# Patient Record
Sex: FEMALE | Race: WHITE | NOT HISPANIC OR LATINO | URBAN - METROPOLITAN AREA
[De-identification: names, ages, dates, MRNs, and addresses within clinical notes are randomized per-mention and may not be internally consistent; named-entity substitution may affect disease eponyms.]

---

## 2019-03-14 ENCOUNTER — OUTPATIENT (OUTPATIENT)
Dept: OUTPATIENT SERVICES | Facility: HOSPITAL | Age: 74
LOS: 1 days | Discharge: HOME | End: 2019-03-14

## 2019-03-14 DIAGNOSIS — E78.5 HYPERLIPIDEMIA, UNSPECIFIED: ICD-10-CM

## 2019-03-14 DIAGNOSIS — E11.9 TYPE 2 DIABETES MELLITUS WITHOUT COMPLICATIONS: ICD-10-CM

## 2019-03-14 DIAGNOSIS — I10 ESSENTIAL (PRIMARY) HYPERTENSION: ICD-10-CM

## 2020-03-12 VITALS — SYSTOLIC BLOOD PRESSURE: 136 MMHG | WEIGHT: 189 LBS | DIASTOLIC BLOOD PRESSURE: 86 MMHG

## 2022-09-09 DIAGNOSIS — Z82.3 FAMILY HISTORY OF STROKE: ICD-10-CM

## 2022-09-09 DIAGNOSIS — R31.29 OTHER MICROSCOPIC HEMATURIA: ICD-10-CM

## 2022-09-09 DIAGNOSIS — Z83.3 FAMILY HISTORY OF DIABETES MELLITUS: ICD-10-CM

## 2022-09-09 DIAGNOSIS — Z78.9 OTHER SPECIFIED HEALTH STATUS: ICD-10-CM

## 2022-09-09 DIAGNOSIS — I10 ESSENTIAL (PRIMARY) HYPERTENSION: ICD-10-CM

## 2022-09-09 DIAGNOSIS — Z82.49 FAMILY HISTORY OF ISCHEMIC HEART DISEASE AND OTHER DISEASES OF THE CIRCULATORY SYSTEM: ICD-10-CM

## 2022-09-09 DIAGNOSIS — Z85.3 PERSONAL HISTORY OF MALIGNANT NEOPLASM OF BREAST: ICD-10-CM

## 2022-09-09 DIAGNOSIS — Z92.89 PERSONAL HISTORY OF OTHER MEDICAL TREATMENT: ICD-10-CM

## 2022-09-09 DIAGNOSIS — Z78.0 ASYMPTOMATIC MENOPAUSAL STATE: ICD-10-CM

## 2022-09-15 ENCOUNTER — APPOINTMENT (OUTPATIENT)
Dept: OBGYN | Facility: CLINIC | Age: 77
End: 2022-09-15

## 2022-10-19 ENCOUNTER — NON-APPOINTMENT (OUTPATIENT)
Age: 77
End: 2022-10-19

## 2023-02-08 ENCOUNTER — APPOINTMENT (OUTPATIENT)
Dept: OBGYN | Facility: CLINIC | Age: 78
End: 2023-02-08
Payer: MEDICARE

## 2023-02-08 ENCOUNTER — LABORATORY RESULT (OUTPATIENT)
Age: 78
End: 2023-02-08

## 2023-02-08 VITALS
BODY MASS INDEX: 32.27 KG/M2 | WEIGHT: 189 LBS | HEIGHT: 64 IN | DIASTOLIC BLOOD PRESSURE: 84 MMHG | SYSTOLIC BLOOD PRESSURE: 146 MMHG | HEART RATE: 76 BPM

## 2023-02-08 DIAGNOSIS — Z12.31 ENCOUNTER FOR SCREENING MAMMOGRAM FOR MALIGNANT NEOPLASM OF BREAST: ICD-10-CM

## 2023-02-08 DIAGNOSIS — Z01.419 ENCOUNTER FOR GYNECOLOGICAL EXAMINATION (GENERAL) (ROUTINE) W/OUT ABNORMAL FINDINGS: ICD-10-CM

## 2023-02-08 DIAGNOSIS — Z00.00 ENCOUNTER FOR GENERAL ADULT MEDICAL EXAMINATION W/OUT ABNORMAL FINDINGS: ICD-10-CM

## 2023-02-08 DIAGNOSIS — N39.0 URINARY TRACT INFECTION, SITE NOT SPECIFIED: ICD-10-CM

## 2023-02-08 DIAGNOSIS — Z78.9 OTHER SPECIFIED HEALTH STATUS: ICD-10-CM

## 2023-02-08 LAB
BILIRUB UR QL STRIP: NORMAL
CLARITY UR: CLEAR
COLLECTION METHOD: NORMAL
GLUCOSE UR-MCNC: NORMAL
HCG UR QL: 0.2 EU/DL
HGB UR QL STRIP.AUTO: NORMAL
KETONES UR-MCNC: NORMAL
LEUKOCYTE ESTERASE UR QL STRIP: NORMAL
NITRITE UR QL STRIP: POSITIVE
PH UR STRIP: 5
PROT UR STRIP-MCNC: NORMAL
SP GR UR STRIP: >=1.03

## 2023-02-08 PROCEDURE — G0101: CPT

## 2023-02-08 PROCEDURE — 99397 PER PM REEVAL EST PAT 65+ YR: CPT | Mod: GY

## 2023-02-08 PROCEDURE — 81003 URINALYSIS AUTO W/O SCOPE: CPT | Mod: QW

## 2023-02-08 RX ORDER — LEVOTHYROXINE SODIUM 0.05 MG/1
50 TABLET ORAL
Refills: 0 | Status: ACTIVE | COMMUNITY

## 2023-02-08 RX ORDER — NITROFURANTOIN (MONOHYDRATE/MACROCRYSTALS) 25; 75 MG/1; MG/1
100 CAPSULE ORAL
Qty: 14 | Refills: 2 | Status: ACTIVE | COMMUNITY
Start: 2023-02-08 | End: 1900-01-01

## 2023-02-08 RX ORDER — ROSUVASTATIN CALCIUM 5 MG/1
5 TABLET, FILM COATED ORAL
Refills: 0 | Status: ACTIVE | COMMUNITY

## 2023-02-08 RX ORDER — HYDROCHLOROTHIAZIDE 12.5 MG/1
12.5 TABLET ORAL
Refills: 0 | Status: ACTIVE | COMMUNITY

## 2023-02-08 RX ORDER — LOSARTAN POTASSIUM 100 MG/1
100 TABLET, FILM COATED ORAL
Refills: 0 | Status: ACTIVE | COMMUNITY

## 2023-02-08 RX ORDER — CHLORHEXIDINE GLUCONATE 4 %
5 LIQUID (ML) TOPICAL
Refills: 0 | Status: ACTIVE | COMMUNITY

## 2023-02-08 NOTE — PHYSICAL EXAM
[Examination Of The Breasts] : a normal appearance [No Masses] : no breast masses were palpable [Labia Majora] : normal [Labia Minora] : normal [Normal] : normal [Uterine Adnexae] : normal [FreeTextEntry2] : inner aspect of left labia majora a 2 cm round darkish pigmented lesion previously biopsied by me benign

## 2023-02-08 NOTE — PLAN
[FreeTextEntry1] : rto 1 year. Patient with asymptomatic  bacteria in urine. Urine sent out for cultures

## 2023-02-13 LAB — BACTERIA UR CULT: NORMAL

## 2023-02-27 DIAGNOSIS — R92.8 OTHER ABNORMAL AND INCONCLUSIVE FINDINGS ON DIAGNOSTIC IMAGING OF BREAST: ICD-10-CM

## 2024-05-20 ENCOUNTER — APPOINTMENT (OUTPATIENT)
Dept: ORTHOPEDIC SURGERY | Facility: CLINIC | Age: 79
End: 2024-05-20

## 2024-05-21 ENCOUNTER — APPOINTMENT (OUTPATIENT)
Dept: ORTHOPEDIC SURGERY | Facility: CLINIC | Age: 79
End: 2024-05-21
Payer: MEDICARE

## 2024-05-21 PROCEDURE — 99203 OFFICE O/P NEW LOW 30 MIN: CPT

## 2024-05-21 PROCEDURE — 73503 X-RAY EXAM HIP UNI 4/> VIEWS: CPT | Mod: RT

## 2024-05-21 NOTE — DISCUSSION/SUMMARY
[de-identified] : Treatment plan as discussed:  My clinical suspicion is high for flareup of arthritis of the hip given the patient's history, physical examination findings, and x-ray findings.  I recommended anti-inflammatory medication.  Meloxicam sent to patient's pharmacy to be taken as needed for pain. Benefits discussed. Confirmed no contraindication to NSAIDs.  The patient is currently being treated for breast cancer.  Therefore, I advised her to confirm with her oncologist that she is able to take NSAIDs as her current breast cancer medication may interfere with and contraindicated with these medications.  Strongly advised patient to confirm with oncologist before taking.  She expresses full understanding.  Red flag symptoms discussed.  I recommended patient rest, ice, compress, and elevate the hip regularly. Encouraged activity modification as tolerable. Encouraged gentle range of motion to avoid stiffness. No gym /sports at least until further evaluation.  Script for physical therapy provided for improved ROM and strengthening of surrounding musculature. Benefits discussed.  All questions and concerns addressed to patient's satisfaction. Patient expresses full understanding of treatment plan. Patient will follow up in 4-6 weeks with Ross CHÁVEZ. Will consider cortisone/gel injections in repeat evaluation if symptoms do not improve. Female

## 2024-05-21 NOTE — IMAGING
[de-identified] : Physical examination of the right hip: No swelling, ecchymosis, or erythema appreciated.  Skin is intact.  Patient mildly tense palpation along the lateral hip line and the groin area.  Full range of motion upon full flexion, extension, and internal/external rotation.  No weakness is appreciated.  Calf is soft and nontender.  Negative Homans' sign.  Mildly antalgic gait.  Negative straight leg raise.  Sensorimotor intact distally.  Neuro vas intact.  X-rays of the right hip taken in the office today:  No acute fractures, subluxations, or dislocations.  Mild to moderate osteoarthritic and degenerative changes appreciated throughout.

## 2024-05-21 NOTE — HISTORY OF PRESENT ILLNESS
[de-identified] : 78-year-old female for evaluation of right hip pain. Patient reports an aching nontraumatic pain that which worsens upon going from standing to standing position going up and down stairs.  Denies any trauma or falls.  He takes Advil over-the-counter without relief.  Able to bear weight and ambulate however experiences pain with doing so.  Patient reports she is a breast cancer patient currently is treated by an oncologist.

## 2024-06-25 ENCOUNTER — APPOINTMENT (OUTPATIENT)
Dept: ORTHOPEDIC SURGERY | Facility: CLINIC | Age: 79
End: 2024-06-25
Payer: MEDICARE

## 2024-06-25 DIAGNOSIS — M16.11 UNILATERAL PRIMARY OSTEOARTHRITIS, RIGHT HIP: ICD-10-CM

## 2024-06-25 PROCEDURE — 99213 OFFICE O/P EST LOW 20 MIN: CPT

## 2024-09-09 ENCOUNTER — INPATIENT (INPATIENT)
Facility: HOSPITAL | Age: 79
LOS: 2 days | Discharge: HOME | DRG: 310 | End: 2024-09-12
Attending: INTERNAL MEDICINE | Admitting: HOSPITALIST
Payer: MEDICARE

## 2024-09-09 VITALS
HEIGHT: 64 IN | DIASTOLIC BLOOD PRESSURE: 82 MMHG | WEIGHT: 186.95 LBS | OXYGEN SATURATION: 97 % | RESPIRATION RATE: 19 BRPM | HEART RATE: 143 BPM | SYSTOLIC BLOOD PRESSURE: 121 MMHG

## 2024-09-09 DIAGNOSIS — I48.92 UNSPECIFIED ATRIAL FLUTTER: ICD-10-CM

## 2024-09-09 LAB
ALBUMIN SERPL ELPH-MCNC: 4.6 G/DL — SIGNIFICANT CHANGE UP (ref 3.5–5.2)
ALP SERPL-CCNC: 54 U/L — SIGNIFICANT CHANGE UP (ref 30–115)
ALT FLD-CCNC: 18 U/L — SIGNIFICANT CHANGE UP (ref 0–41)
ANION GAP SERPL CALC-SCNC: 11 MMOL/L — SIGNIFICANT CHANGE UP (ref 7–14)
AST SERPL-CCNC: 17 U/L — SIGNIFICANT CHANGE UP (ref 0–41)
BASOPHILS # BLD AUTO: 0.04 K/UL — SIGNIFICANT CHANGE UP (ref 0–0.2)
BASOPHILS NFR BLD AUTO: 0.5 % — SIGNIFICANT CHANGE UP (ref 0–1)
BILIRUB SERPL-MCNC: 0.6 MG/DL — SIGNIFICANT CHANGE UP (ref 0.2–1.2)
BUN SERPL-MCNC: 19 MG/DL — SIGNIFICANT CHANGE UP (ref 10–20)
CALCIUM SERPL-MCNC: 9.4 MG/DL — SIGNIFICANT CHANGE UP (ref 8.4–10.5)
CHLORIDE SERPL-SCNC: 105 MMOL/L — SIGNIFICANT CHANGE UP (ref 98–110)
CO2 SERPL-SCNC: 23 MMOL/L — SIGNIFICANT CHANGE UP (ref 17–32)
CREAT SERPL-MCNC: 1 MG/DL — SIGNIFICANT CHANGE UP (ref 0.7–1.5)
EGFR: 57 ML/MIN/1.73M2 — LOW
EOSINOPHIL # BLD AUTO: 0.09 K/UL — SIGNIFICANT CHANGE UP (ref 0–0.7)
EOSINOPHIL NFR BLD AUTO: 1.2 % — SIGNIFICANT CHANGE UP (ref 0–8)
FLUAV AG NPH QL: SIGNIFICANT CHANGE UP
FLUBV AG NPH QL: SIGNIFICANT CHANGE UP
GAS PNL BLDV: SIGNIFICANT CHANGE UP
GLUCOSE SERPL-MCNC: 126 MG/DL — HIGH (ref 70–99)
HCT VFR BLD CALC: 39 % — SIGNIFICANT CHANGE UP (ref 37–47)
HGB BLD-MCNC: 13 G/DL — SIGNIFICANT CHANGE UP (ref 12–16)
IMM GRANULOCYTES NFR BLD AUTO: 0.4 % — HIGH (ref 0.1–0.3)
LYMPHOCYTES # BLD AUTO: 2.1 K/UL — SIGNIFICANT CHANGE UP (ref 1.2–3.4)
LYMPHOCYTES # BLD AUTO: 27.5 % — SIGNIFICANT CHANGE UP (ref 20.5–51.1)
MCHC RBC-ENTMCNC: 30.8 PG — SIGNIFICANT CHANGE UP (ref 27–31)
MCHC RBC-ENTMCNC: 33.3 G/DL — SIGNIFICANT CHANGE UP (ref 32–37)
MCV RBC AUTO: 92.4 FL — SIGNIFICANT CHANGE UP (ref 81–99)
MONOCYTES # BLD AUTO: 0.82 K/UL — HIGH (ref 0.1–0.6)
MONOCYTES NFR BLD AUTO: 10.7 % — HIGH (ref 1.7–9.3)
NEUTROPHILS # BLD AUTO: 4.56 K/UL — SIGNIFICANT CHANGE UP (ref 1.4–6.5)
NEUTROPHILS NFR BLD AUTO: 59.7 % — SIGNIFICANT CHANGE UP (ref 42.2–75.2)
NRBC # BLD: 0 /100 WBCS — SIGNIFICANT CHANGE UP (ref 0–0)
PLATELET # BLD AUTO: 173 K/UL — SIGNIFICANT CHANGE UP (ref 130–400)
PMV BLD: 11.2 FL — HIGH (ref 7.4–10.4)
POTASSIUM SERPL-MCNC: 4 MMOL/L — SIGNIFICANT CHANGE UP (ref 3.5–5)
POTASSIUM SERPL-SCNC: 4 MMOL/L — SIGNIFICANT CHANGE UP (ref 3.5–5)
PROT SERPL-MCNC: 6.8 G/DL — SIGNIFICANT CHANGE UP (ref 6–8)
RBC # BLD: 4.22 M/UL — SIGNIFICANT CHANGE UP (ref 4.2–5.4)
RBC # FLD: 14.4 % — SIGNIFICANT CHANGE UP (ref 11.5–14.5)
RSV RNA NPH QL NAA+NON-PROBE: SIGNIFICANT CHANGE UP
SARS-COV-2 RNA SPEC QL NAA+PROBE: SIGNIFICANT CHANGE UP
SODIUM SERPL-SCNC: 139 MMOL/L — SIGNIFICANT CHANGE UP (ref 135–146)
WBC # BLD: 7.64 K/UL — SIGNIFICANT CHANGE UP (ref 4.8–10.8)
WBC # FLD AUTO: 7.64 K/UL — SIGNIFICANT CHANGE UP (ref 4.8–10.8)

## 2024-09-09 PROCEDURE — 92960 CARDIOVERSION ELECTRIC EXT: CPT

## 2024-09-09 PROCEDURE — 84100 ASSAY OF PHOSPHORUS: CPT

## 2024-09-09 PROCEDURE — 84484 ASSAY OF TROPONIN QUANT: CPT

## 2024-09-09 PROCEDURE — 93010 ELECTROCARDIOGRAM REPORT: CPT | Mod: 77

## 2024-09-09 PROCEDURE — 80048 BASIC METABOLIC PNL TOTAL CA: CPT

## 2024-09-09 PROCEDURE — 83880 ASSAY OF NATRIURETIC PEPTIDE: CPT

## 2024-09-09 PROCEDURE — 83735 ASSAY OF MAGNESIUM: CPT

## 2024-09-09 PROCEDURE — 93325 DOPPLER ECHO COLOR FLOW MAPG: CPT

## 2024-09-09 PROCEDURE — 71045 X-RAY EXAM CHEST 1 VIEW: CPT | Mod: 26

## 2024-09-09 PROCEDURE — 86900 BLOOD TYPING SEROLOGIC ABO: CPT

## 2024-09-09 PROCEDURE — 85730 THROMBOPLASTIN TIME PARTIAL: CPT

## 2024-09-09 PROCEDURE — 99291 CRITICAL CARE FIRST HOUR: CPT | Mod: GC

## 2024-09-09 PROCEDURE — 80061 LIPID PANEL: CPT

## 2024-09-09 PROCEDURE — 93005 ELECTROCARDIOGRAM TRACING: CPT

## 2024-09-09 PROCEDURE — 85610 PROTHROMBIN TIME: CPT

## 2024-09-09 PROCEDURE — 86850 RBC ANTIBODY SCREEN: CPT

## 2024-09-09 PROCEDURE — 0241U: CPT

## 2024-09-09 PROCEDURE — 80053 COMPREHEN METABOLIC PANEL: CPT

## 2024-09-09 PROCEDURE — 83036 HEMOGLOBIN GLYCOSYLATED A1C: CPT

## 2024-09-09 PROCEDURE — 99222 1ST HOSP IP/OBS MODERATE 55: CPT | Mod: GC

## 2024-09-09 PROCEDURE — 85025 COMPLETE CBC W/AUTO DIFF WBC: CPT

## 2024-09-09 PROCEDURE — 93320 DOPPLER ECHO COMPLETE: CPT

## 2024-09-09 PROCEDURE — 93312 ECHO TRANSESOPHAGEAL: CPT

## 2024-09-09 PROCEDURE — 86901 BLOOD TYPING SEROLOGIC RH(D): CPT

## 2024-09-09 PROCEDURE — 87040 BLOOD CULTURE FOR BACTERIA: CPT

## 2024-09-09 PROCEDURE — 93010 ELECTROCARDIOGRAM REPORT: CPT

## 2024-09-09 PROCEDURE — 93308 TTE F-UP OR LMTD: CPT

## 2024-09-09 PROCEDURE — 84436 ASSAY OF TOTAL THYROXINE: CPT

## 2024-09-09 PROCEDURE — 84443 ASSAY THYROID STIM HORMONE: CPT

## 2024-09-09 PROCEDURE — 36415 COLL VENOUS BLD VENIPUNCTURE: CPT

## 2024-09-09 PROCEDURE — 85379 FIBRIN DEGRADATION QUANT: CPT

## 2024-09-09 PROCEDURE — 93307 TTE W/O DOPPLER COMPLETE: CPT

## 2024-09-09 RX ORDER — LOSARTAN POTASSIUM 50 MG/1
1 TABLET ORAL
Refills: 0 | DISCHARGE

## 2024-09-09 RX ORDER — LETROZOLE 2.5 MG/1
2.5 TABLET, FILM COATED ORAL DAILY
Refills: 0 | Status: DISCONTINUED | OUTPATIENT
Start: 2024-09-09 | End: 2024-09-12

## 2024-09-09 RX ORDER — APIXABAN 5 MG/1
5 TABLET, FILM COATED ORAL ONCE
Refills: 0 | Status: COMPLETED | OUTPATIENT
Start: 2024-09-09 | End: 2024-09-09

## 2024-09-09 RX ORDER — ROSUVASTATIN CALCIUM 10 MG/1
5 TABLET ORAL AT BEDTIME
Refills: 0 | Status: DISCONTINUED | OUTPATIENT
Start: 2024-09-09 | End: 2024-09-12

## 2024-09-09 RX ORDER — SODIUM CHLORIDE 9 MG/ML
1000 INJECTION INTRAMUSCULAR; INTRAVENOUS; SUBCUTANEOUS ONCE
Refills: 0 | Status: COMPLETED | OUTPATIENT
Start: 2024-09-09 | End: 2024-09-09

## 2024-09-09 RX ORDER — LEVOTHYROXINE SODIUM 100 MCG
1 TABLET ORAL
Refills: 0 | DISCHARGE

## 2024-09-09 RX ORDER — HYDROCHLOROTHIAZIDE 12.5 MG/1
1 CAPSULE ORAL
Refills: 0 | DISCHARGE

## 2024-09-09 RX ORDER — DILTIAZEM HYDROCHLORIDE 5 MG/ML
60 INJECTION INTRAVENOUS ONCE
Refills: 0 | Status: COMPLETED | OUTPATIENT
Start: 2024-09-09 | End: 2024-09-09

## 2024-09-09 RX ORDER — APIXABAN 5 MG/1
5 TABLET, FILM COATED ORAL EVERY 12 HOURS
Refills: 0 | Status: DISCONTINUED | OUTPATIENT
Start: 2024-09-10 | End: 2024-09-10

## 2024-09-09 RX ORDER — DILTIAZEM HYDROCHLORIDE 5 MG/ML
21 INJECTION INTRAVENOUS ONCE
Refills: 0 | Status: COMPLETED | OUTPATIENT
Start: 2024-09-09 | End: 2024-09-09

## 2024-09-09 RX ORDER — LETROZOLE 2.5 MG/1
1 TABLET, FILM COATED ORAL
Refills: 0 | DISCHARGE

## 2024-09-09 RX ORDER — ENOXAPARIN SODIUM 100 MG/ML
85 INJECTION SUBCUTANEOUS EVERY 12 HOURS
Refills: 0 | Status: DISCONTINUED | OUTPATIENT
Start: 2024-09-09 | End: 2024-09-09

## 2024-09-09 RX ORDER — LEVOTHYROXINE SODIUM 100 MCG
50 TABLET ORAL DAILY
Refills: 0 | Status: DISCONTINUED | OUTPATIENT
Start: 2024-09-09 | End: 2024-09-12

## 2024-09-09 RX ORDER — LOSARTAN POTASSIUM 50 MG/1
50 TABLET ORAL DAILY
Refills: 0 | Status: DISCONTINUED | OUTPATIENT
Start: 2024-09-09 | End: 2024-09-12

## 2024-09-09 RX ORDER — METOPROLOL TARTRATE 100 MG/1
12.5 TABLET ORAL EVERY 12 HOURS
Refills: 0 | Status: DISCONTINUED | OUTPATIENT
Start: 2024-09-09 | End: 2024-09-12

## 2024-09-09 RX ADMIN — APIXABAN 5 MILLIGRAM(S): 5 TABLET, FILM COATED ORAL at 17:07

## 2024-09-09 RX ADMIN — SODIUM CHLORIDE 1000 MILLILITER(S): 9 INJECTION INTRAMUSCULAR; INTRAVENOUS; SUBCUTANEOUS at 14:26

## 2024-09-09 RX ADMIN — DILTIAZEM HYDROCHLORIDE 21 MILLIGRAM(S): 5 INJECTION INTRAVENOUS at 14:47

## 2024-09-09 RX ADMIN — DILTIAZEM HYDROCHLORIDE 60 MILLIGRAM(S): 5 INJECTION INTRAVENOUS at 17:07

## 2024-09-09 NOTE — ED PROVIDER NOTE - ATTENDING CONTRIBUTION TO CARE
I personally evaluated patient. I agree with the findings and plan with all documentation on chart except as documented  in my note.    78-year-old female who presents to the emergency department referred from urgent care for tachycardia.  Patient reports feeling congested and having a cough over the past several days and was taking cough medicine.  She took the cough medicine that is safe with hypertension and did not have dextromethorphan.  Patient denies any fever or chills.  Symptoms were not improving and patient went to urgent care today and found to have a heart rate of 140s to 150s and ambulance was called.  Patient has no history of A-fib.    Patient seen and evaluated and is tachycardic in 140s.  EKG performed and concerning for possible a flutter.  Patient has normal mental status and is stable and has good blood pressure.  Neuroexam is nonfocal and patient has no signs of DVT on exam.  Patient has clear lungs bilaterally.  Large-bore IV access obtained and full labs sent, chest x-ray performed, bedside echo performed.  Patient given Cardizem and was rate controlled and repeat EKG consistent with a flutter.  CHADS2 Vasc score calculated and patient is appropriate for Eliquis.  Bedside ultrasound performed by ultrasound team and shows decreased EF.  Will admit patient to telemetry for new onset a flutter with decreased EF.  Patient spoken to in detail about results and plan of care, need for admission.  All questions and concerns addressed.  Patient given oral bridge with Cardizem and Eliquis dose.

## 2024-09-09 NOTE — H&P ADULT - ATTENDING COMMENTS
HPI:  Ms. Ramos is 79 yo F with a PMH of HTN, HLD, Hypothyroidism, and Breast Cancer presenting after being seen in urgent care where she was found to be in Aflutter to a rate of 140. She had been experiencing a cough over the past 3 days which is what prompted her visit to urgent care, but otherwise denies other symptoms. She denies chest pain, shortness of breath, abdominal pain, nausea, vomiting, and diarrhea. She says that many years ago (>>10 years ago) she was told that she had a "faulty electrical system in my heart" but was never on any medication, blood thinners, or underwent an echocardiogram or other cardiac procedures. She is admitted to medicine for management of new Afib.    Vitals  Temp: 98  HR: 143 -> 96 -> 106  BP: 121/82  O2: 95% on RA -> 96% on RA  RR: 18 ->18    Labs  WBC: 7.64  HgB: 13.0  Lactate: 1.6    Imaging  CXR: Prominent bilateral pulmonary vessels may represent congestion.    EKG: Aflutter    In the ED  1L NS Bolus x1  Eliquis 5mg PO x1  Diltiazem 21mg IV x1  Diltiazem 60mg PO x1   (09 Sep 2024 21:23)    REVIEW OF SYSTEMS: see cc/HPI   CONSTITUTIONAL: No weakness, fevers or chills  EYES/ENT: No visual changes;  No vertigo or throat pain   NECK: No pain or stiffness  RESPIRATORY: ((+) cough, wheezing, hemoptysis; No shortness of breath, see cc/HPI   CARDIOVASCULAR: No chest pain (+) palpitations  GASTROINTESTINAL: No abdominal or epigastric pain. No nausea, vomiting, or hematemesis; No diarrhea or constipation. No melena or hematochezia.  GENITOURINARY: No dysuria, frequency or hematuria  NEUROLOGICAL: No numbness or weakness  SKIN: No itching, rashes  ENDO: No hyperglycemia, No thyroid disorder, No dyslipidemia   HEM: No bleeding, No easy bruising, No anemia   PSYCHE: No psychosis, No mood disorder No hallucinations No delusion   MSK: No deformity, No fracture, No Joint swelling    Physical Exam:  General: WN/WD NAD  Neurology: A&Ox3, nonfocal, follows commands  Eyes: PERRLA/ EOMI  ENT/Neck: Neck supple, trachea midline, No JVD  Respiratory: CTA B/L, No wheezing, rales, rhonchi  CV: Normal rate regular rhythm, S1S2, no murmurs, rubs or gallops  Abdominal: Soft, NT, ND +BS,   Extremities: No edema, + peripheral pulses  Skin: No Rashes, Hematoma, Ecchymosis      A/p  New onset A. flutter w/ RVR    suspected HF?EF 2/2 A. flutter (CXR w/ bilateral vasc congestion) ???VOLUME STATUS ###########  -admit to tele   -serial trop and EKG  -TSH  -c/w Eliquis 5mg BID  -EP eval     H/o hypothyroidism   -check TSH   -c/w current L thyroxine supplementation for now    HTN   -c/w losartan     Dyslipidemia   -statin     H/o breast cancer   -Letrozole HPI:  Ms. Ramos is 79 yo F with a PMH of HTN, HLD, Hypothyroidism, and Breast Cancer presenting after being seen in urgent care where she was found to be in Aflutter to a rate of 140. She had been experiencing a cough over the past 3 days which is what prompted her visit to urgent care, but otherwise denies other symptoms. She denies chest pain, shortness of breath, abdominal pain, nausea, vomiting, and diarrhea. She says that many years ago (>>10 years ago) she was told that she had a "faulty electrical system in my heart" but was never on any medication, blood thinners, or underwent an echocardiogram or other cardiac procedures. She is admitted to medicine for management of new Afib.    Vitals  Temp: 98  HR: 143 -> 96 -> 106  BP: 121/82  O2: 95% on RA -> 96% on RA  RR: 18 ->18    Labs  WBC: 7.64  HgB: 13.0  Lactate: 1.6    Imaging  CXR: Prominent bilateral pulmonary vessels may represent congestion.    EKG: Aflutter    In the ED  1L NS Bolus x1  Eliquis 5mg PO x1  Diltiazem 21mg IV x1  Diltiazem 60mg PO x1   (09 Sep 2024 21:23)    REVIEW OF SYSTEMS: see cc/HPI   CONSTITUTIONAL: No weakness, fevers or chills  EYES/ENT: No visual changes;  No vertigo or throat pain   NECK: No pain or stiffness  RESPIRATORY: ((+) cough, wheezing, hemoptysis; No shortness of breath, see cc/HPI   CARDIOVASCULAR: No chest pain (+) palpitations  GASTROINTESTINAL: No abdominal or epigastric pain. No nausea, vomiting, or hematemesis; No diarrhea or constipation. No melena or hematochezia.  GENITOURINARY: No dysuria, frequency or hematuria  NEUROLOGICAL: No numbness or weakness  SKIN: No itching, rashes  ENDO: No hyperglycemia, No thyroid disorder, No dyslipidemia   HEM: No bleeding, No easy bruising, No anemia   PSYCHE: No psychosis, No mood disorder No hallucinations No delusion   MSK: No deformity, No fracture, No Joint swelling    Physical Exam:  General: WN/WD NAD  Neurology: A&Ox3, nonfocal, follows commands  Eyes: PERRLA/ EOMI  ENT/Neck: Neck supple, trachea midline, No JVD  Respiratory: B/L basilar rales and cough, No wheezing,  No rhonchi  CV: Normal rate regular rhythm, S1S2, no murmurs, rubs or gallops  Abdominal: Soft, NT, ND +BS,   Extremities: (+) Mild pedal edema, + peripheral pulses  Skin: No Rashes, Hematoma, Ecchymosis      A/p  New onset A. flutter w/ RVR    suspected HF?EF 2/2 A. flutter (CXR w/ bilateral vasc congestion) w/ mild fluid overload  -admit to tele   -serial trop and EKG  -rate control w/ Metoprolol   -TSH  -c/w Eliquis 5mg BID  -EP eval     H/o hypothyroidism   -check TSH   -c/w current L thyroxine supplementation for now    HTN   -c/w losartan     Dyslipidemia   -statin     H/o breast cancer   -Letrozole    PATIENT SEEN by ATTENDING 9/9/24

## 2024-09-09 NOTE — H&P ADULT - ASSESSMENT
Ms. Ramos is 79 yo F with a PMH of HTN, HLD, Hypothyroidism, and Breast Cancer presenting for new onset Aflutter to rate of 140.    #New onset Aflutter  - Patient had cough x3d, took OTC cough medication does not remember name  - Went to urgent care, found to be in Aflutter to 140s, sent to ED  - Given Cardizem IV and PO in ED, rate controlled afterward  - Patient states she was told she had "faulty electrical system in my heart" >>10 years ago  - No ECHO, Anticoagulation, or other Cardiac workup she remembers  - Patient states she is not coughing, has no chest pain  -   #Hypothyroidism  - C/w Levothyroxine 50mcg QD  - TSH in AM    #HTN  - C/w Losartan 50mg QD, please confirm dose in AM    #HLD  - C/w Rosuvastatin 5mg QD     Ms. Ramos is 77 yo F with a PMH of HTN, HLD, Hypothyroidism, and Breast Cancer presenting for new onset Aflutter to rate of 140.    #New onset Aflutter  - Patient had cough x3d, took OTC cough medication does not remember name  - Went to urgent care, found to be in Aflutter to 140s, sent to ED  - Given Cardizem IV and PO in ED, rate controlled afterward  - Patient states she was told she had "faulty electrical system in my heart" >>10 years ago  - No ECHO, Anticoagulation, or other Cardiac workup she remembers  - Patient states she is not coughing, has no chest pain  - Given Eliquis 5mg in ED, continue BID  - Metoprolol Tartrate 12.5mg BID  - TTE  - NPO @ Midnight  - F/u EP eval    #Hypothyroidism  - C/w Levothyroxine 50mcg QD  - TSH in AM    #HTN  - C/w Losartan 50mg QD, please confirm dose in AM    #HLD  - C/w Rosuvastatin 5mg QD    #Hx of Breast Cancer  - C/w Letrozole 2.5mg QD    #Misc  #Code Status: Full Code  #DVT ppx: Eliquis  #GI ppx: N/A  #Diet: DASH, NPO at Midnight  #Activity: AAT  #Dispo: Telemetry

## 2024-09-09 NOTE — ED PROVIDER NOTE - CLINICAL SUMMARY MEDICAL DECISION MAKING FREE TEXT BOX
79 y/o female presented from urgent care for tachycardia. EKG showing new onset atrial flutter with rate in 140's. Cardizem 20mg IV given that resulted in rate control. Patient has remained hemodynamically stable. Po cardizem and Eliquis given. Admit to [facility] for further cardiac/EP evaluation.

## 2024-09-09 NOTE — ED PROVIDER NOTE - OBJECTIVE STATEMENT
78-year-old patient with PMH hypertension, breast CA, hypothyroidism presents to ED for tachycardia.  Patient states that she has been having a cough for 3 days, so yesterday she took an unknown cough medication OTC and last night took Advil PM OTC.  This morning she went to an urgent care for her symptoms and was found to be tachycardic to 140s, was told to go to ED.  On presentation patient tachycardic to 140s, a flutter appreciated on ECG.  Not on anticoagulation.  Denies fever, chills, SOB, chest pain, dizziness, blurry vision, headache, N/V/D, back pain, dysuria, any other complaints.

## 2024-09-09 NOTE — H&P ADULT - HISTORY OF PRESENT ILLNESS
Vitals  Temp: 98  HR: 143 -> 96 -> 106  BP: 121/82  O2: 95% on RA -> 96% on RA  RR: 18 ->18    Labs  WBC: 7.64  HgB: 13.0  Lactate: 1.6    Imaging  CXR: Prominent bilateral pulmonary vessels may represent congestion.    EKG: Aflutter    In the ED  1L NS Bolus x1  Eliquis 5mg PO x1  Diltiazem 21mg IV x1  Diltiazem 60mg PO x1   Ms. Ramos is 77 yo F with a PMH of HTN, HLD, Hypothyroidism, and Breast Cancer presenting after being seen in urgent care where she was found to be in Aflutter to a rate of 140. She had been experiencing a cough over the past 3 days which is what prompted her visit to urgent care, but otherwise denies other symptoms. She denies chest pain, shortness of breath, abdominal pain, nausea, vomiting, and diarrhea. She says that many years ago (>>10 years ago) she was told that she had a "faulty electrical system in my heart" but was never on any medication, blood thinners, or underwent an echocardiogram or other cardiac procedures. She is admitted to medicine for management of new Afib.    Vitals  Temp: 98  HR: 143 -> 96 -> 106  BP: 121/82  O2: 95% on RA -> 96% on RA  RR: 18 ->18    Labs  WBC: 7.64  HgB: 13.0  Lactate: 1.6    Imaging  CXR: Prominent bilateral pulmonary vessels may represent congestion.    EKG: Aflutter    In the ED  1L NS Bolus x1  Eliquis 5mg PO x1  Diltiazem 21mg IV x1  Diltiazem 60mg PO x1

## 2024-09-09 NOTE — ED PROVIDER NOTE - DIFFERENTIAL DIAGNOSIS
The differential diagnosis for patients clinical presentation includes but is not limited to:  Arrhythmia, PE, infection, PNA Differential Diagnosis The differential diagnosis for patients clinical presentation includes but is not limited to:  Arrhythmia, PE, URI, PNA

## 2024-09-09 NOTE — H&P ADULT - HIV OFFER
Eyes with no visual disturbances.  Ears clean and dry and no hearing difficulties. Nose with pink mucosa and no drainage.  Mouth mucous membranes moist and pink.  No tenderness or swelling to throat or neck. Opt out Nose with pink mucosa and no drainage.  Mouth mucous membranes moist and pink.  No swelling to throat or neck.

## 2024-09-09 NOTE — H&P ADULT - NSHPPHYSICALEXAM_GEN_ALL_CORE
General: NAD, lying in bed comfortably  Head: NCAT  Neck: Supple, no JVD  Cardiac: Aflutter, rate controlled  Pulmonary: Mild crackle at base, no wheezing  Abdominal: Soft, nontender, and nondistended  Extremities: No pitting edema  Neurologic: AOx3, nonfocal  Skin: No rashes or lesions

## 2024-09-09 NOTE — ED PROVIDER NOTE - ADDITIONAL EKG
I called pt and no answer. I left a voice message to pt to have her call [facility] at: [phone] in regards to the breast pump.  
Left message to call back for: stats update  Information to relay to patient:  Below message. Please have patient call [phone]    
Who is calling:   [facility]   Reason for Call:  Calling because their unable to get a hold of patient to see if there has been separation. Please advise of how to get a hold of patient. Regarding breast pump and letter of necessities signed by Dr. Mix  Date of last appointment with primary care: 11/04/19  Okay to leave a detailed message: Yes      
Who is calling:  Candace from [employer] with  [employer]  Reason for Call:  Requesting clarification. Is patient still using breast pump? Still ? How is the clinic aware of this? Please advise.  Date of last appointment with primary care: 11/4/19  Okay to leave a detailed message: Yes      
Additional EKG Note...

## 2024-09-09 NOTE — ED PROVIDER NOTE - PHYSICAL EXAMINATION
VITAL SIGNS: I have reviewed nursing notes and confirm.  CONSTITUTIONAL: Well-developed; well-nourished; in no acute distress.  SKIN: Skin exam is warm and dry, no acute rash.  EYES: PERRL, EOM intact; conjunctiva and sclera clear.  NECK: Supple; non tender.  CARD: S1, S2 normal; no murmurs, gallops, or rubs. Tachycardic.  RESP: Normal respiratory effort, no tachypnea or distress. Lungs CTAB, no wheezes, rales or rhonchi.  ABD: soft, NT/ND.  EXT: Normal ROM. No clubbing, cyanosis or edema.  NEURO: Alert, oriented. Grossly unremarkable. No focal deficits.  PSYCH: Cooperative, appropriate.

## 2024-09-09 NOTE — ED ADULT TRIAGE NOTE - CHIEF COMPLAINT QUOTE
Pt. BIBEMS c/o rapid HR. Pt. states she was at  and was sent in for elevated HR. Pt. denies chest pain. Pt, states she has been taking cough medicine and has been noticing elevated HR since.

## 2024-09-09 NOTE — H&P ADULT - NSHPREVIEWOFSYSTEMS_GEN_ALL_CORE
Review of Systems  Constitutional: No weakness, fevers, or chills  Eyes/ENT: No visual changes. No vertigo or throat pain   Neck: No pain or stiffness  Respiratory: Endorses cough, improved  Cardiovascular: No chest pain or palpitations  Gastrointestinal: No abdominal or epigastric pain. No nausea, vomiting, or hematemesis. No diarrhea or constipation. No melena or hematochezia.  Genitourinary: No dysuria, frequency, or hematuria  Musculoskeletal: FROM all extremities, normal strength, no calf tenderness  Neurological: No numbness or weakness  Skin: No itching or rashes

## 2024-09-10 ENCOUNTER — RESULT REVIEW (OUTPATIENT)
Age: 79
End: 2024-09-10

## 2024-09-10 LAB
A1C WITH ESTIMATED AVERAGE GLUCOSE RESULT: 6.8 % — HIGH (ref 4–5.6)
ALBUMIN SERPL ELPH-MCNC: 4.5 G/DL — SIGNIFICANT CHANGE UP (ref 3.5–5.2)
ALP SERPL-CCNC: 54 U/L — SIGNIFICANT CHANGE UP (ref 30–115)
ALT FLD-CCNC: 18 U/L — SIGNIFICANT CHANGE UP (ref 0–41)
ANION GAP SERPL CALC-SCNC: 11 MMOL/L — SIGNIFICANT CHANGE UP (ref 7–14)
ANION GAP SERPL CALC-SCNC: 11 MMOL/L — SIGNIFICANT CHANGE UP (ref 7–14)
APTT BLD: 35.4 SEC — SIGNIFICANT CHANGE UP (ref 27–39.2)
AST SERPL-CCNC: 16 U/L — SIGNIFICANT CHANGE UP (ref 0–41)
BASOPHILS # BLD AUTO: 0.03 K/UL — SIGNIFICANT CHANGE UP (ref 0–0.2)
BASOPHILS # BLD AUTO: 0.04 K/UL — SIGNIFICANT CHANGE UP (ref 0–0.2)
BASOPHILS NFR BLD AUTO: 0.4 % — SIGNIFICANT CHANGE UP (ref 0–1)
BASOPHILS NFR BLD AUTO: 0.4 % — SIGNIFICANT CHANGE UP (ref 0–1)
BILIRUB SERPL-MCNC: 0.8 MG/DL — SIGNIFICANT CHANGE UP (ref 0.2–1.2)
BUN SERPL-MCNC: 14 MG/DL — SIGNIFICANT CHANGE UP (ref 10–20)
BUN SERPL-MCNC: 16 MG/DL — SIGNIFICANT CHANGE UP (ref 10–20)
CALCIUM SERPL-MCNC: 9.2 MG/DL — SIGNIFICANT CHANGE UP (ref 8.4–10.5)
CALCIUM SERPL-MCNC: 9.3 MG/DL — SIGNIFICANT CHANGE UP (ref 8.4–10.5)
CHLORIDE SERPL-SCNC: 103 MMOL/L — SIGNIFICANT CHANGE UP (ref 98–110)
CHLORIDE SERPL-SCNC: 105 MMOL/L — SIGNIFICANT CHANGE UP (ref 98–110)
CHOLEST SERPL-MCNC: 137 MG/DL — SIGNIFICANT CHANGE UP
CO2 SERPL-SCNC: 22 MMOL/L — SIGNIFICANT CHANGE UP (ref 17–32)
CO2 SERPL-SCNC: 24 MMOL/L — SIGNIFICANT CHANGE UP (ref 17–32)
CREAT SERPL-MCNC: 0.9 MG/DL — SIGNIFICANT CHANGE UP (ref 0.7–1.5)
CREAT SERPL-MCNC: 0.9 MG/DL — SIGNIFICANT CHANGE UP (ref 0.7–1.5)
D DIMER BLD IA.RAPID-MCNC: 179 NG/ML DDU — SIGNIFICANT CHANGE UP
D DIMER BLD IA.RAPID-MCNC: 270 NG/ML DDU — HIGH
EGFR: 65 ML/MIN/1.73M2 — SIGNIFICANT CHANGE UP
EGFR: 65 ML/MIN/1.73M2 — SIGNIFICANT CHANGE UP
EOSINOPHIL # BLD AUTO: 0.07 K/UL — SIGNIFICANT CHANGE UP (ref 0–0.7)
EOSINOPHIL # BLD AUTO: 0.11 K/UL — SIGNIFICANT CHANGE UP (ref 0–0.7)
EOSINOPHIL NFR BLD AUTO: 0.9 % — SIGNIFICANT CHANGE UP (ref 0–8)
EOSINOPHIL NFR BLD AUTO: 1.2 % — SIGNIFICANT CHANGE UP (ref 0–8)
ESTIMATED AVERAGE GLUCOSE: 148 MG/DL — HIGH (ref 68–114)
GLUCOSE SERPL-MCNC: 135 MG/DL — HIGH (ref 70–99)
GLUCOSE SERPL-MCNC: 182 MG/DL — HIGH (ref 70–99)
HCT VFR BLD CALC: 37.1 % — SIGNIFICANT CHANGE UP (ref 37–47)
HCT VFR BLD CALC: 39.6 % — SIGNIFICANT CHANGE UP (ref 37–47)
HDLC SERPL-MCNC: 59 MG/DL — SIGNIFICANT CHANGE UP
HGB BLD-MCNC: 12.4 G/DL — SIGNIFICANT CHANGE UP (ref 12–16)
HGB BLD-MCNC: 13.3 G/DL — SIGNIFICANT CHANGE UP (ref 12–16)
IMM GRANULOCYTES NFR BLD AUTO: 0.5 % — HIGH (ref 0.1–0.3)
IMM GRANULOCYTES NFR BLD AUTO: 0.7 % — HIGH (ref 0.1–0.3)
INR BLD: 1.56 RATIO — HIGH (ref 0.65–1.3)
LIPID PNL WITH DIRECT LDL SERPL: 62 MG/DL — SIGNIFICANT CHANGE UP
LYMPHOCYTES # BLD AUTO: 1.24 K/UL — SIGNIFICANT CHANGE UP (ref 1.2–3.4)
LYMPHOCYTES # BLD AUTO: 16.2 % — LOW (ref 20.5–51.1)
LYMPHOCYTES # BLD AUTO: 2.1 K/UL — SIGNIFICANT CHANGE UP (ref 1.2–3.4)
LYMPHOCYTES # BLD AUTO: 23.4 % — SIGNIFICANT CHANGE UP (ref 20.5–51.1)
MAGNESIUM SERPL-MCNC: 1.9 MG/DL — SIGNIFICANT CHANGE UP (ref 1.8–2.4)
MAGNESIUM SERPL-MCNC: 2.1 MG/DL — SIGNIFICANT CHANGE UP (ref 1.8–2.4)
MCHC RBC-ENTMCNC: 30.9 PG — SIGNIFICANT CHANGE UP (ref 27–31)
MCHC RBC-ENTMCNC: 31.3 PG — HIGH (ref 27–31)
MCHC RBC-ENTMCNC: 33.4 G/DL — SIGNIFICANT CHANGE UP (ref 32–37)
MCHC RBC-ENTMCNC: 33.6 G/DL — SIGNIFICANT CHANGE UP (ref 32–37)
MCV RBC AUTO: 92.1 FL — SIGNIFICANT CHANGE UP (ref 81–99)
MCV RBC AUTO: 93.7 FL — SIGNIFICANT CHANGE UP (ref 81–99)
MONOCYTES # BLD AUTO: 0.67 K/UL — HIGH (ref 0.1–0.6)
MONOCYTES # BLD AUTO: 0.69 K/UL — HIGH (ref 0.1–0.6)
MONOCYTES NFR BLD AUTO: 7.7 % — SIGNIFICANT CHANGE UP (ref 1.7–9.3)
MONOCYTES NFR BLD AUTO: 8.7 % — SIGNIFICANT CHANGE UP (ref 1.7–9.3)
NEUTROPHILS # BLD AUTO: 5.62 K/UL — SIGNIFICANT CHANGE UP (ref 1.4–6.5)
NEUTROPHILS # BLD AUTO: 5.96 K/UL — SIGNIFICANT CHANGE UP (ref 1.4–6.5)
NEUTROPHILS NFR BLD AUTO: 66.6 % — SIGNIFICANT CHANGE UP (ref 42.2–75.2)
NEUTROPHILS NFR BLD AUTO: 73.3 % — SIGNIFICANT CHANGE UP (ref 42.2–75.2)
NON HDL CHOLESTEROL: 78 MG/DL — SIGNIFICANT CHANGE UP
NRBC # BLD: 0 /100 WBCS — SIGNIFICANT CHANGE UP (ref 0–0)
NRBC # BLD: 0 /100 WBCS — SIGNIFICANT CHANGE UP (ref 0–0)
NT-PROBNP SERPL-SCNC: 1580 PG/ML — HIGH (ref 0–300)
NT-PROBNP SERPL-SCNC: 1657 PG/ML — HIGH (ref 0–300)
PHOSPHATE SERPL-MCNC: 2.9 MG/DL — SIGNIFICANT CHANGE UP (ref 2.1–4.9)
PLATELET # BLD AUTO: 162 K/UL — SIGNIFICANT CHANGE UP (ref 130–400)
PLATELET # BLD AUTO: 180 K/UL — SIGNIFICANT CHANGE UP (ref 130–400)
PMV BLD: 10.8 FL — HIGH (ref 7.4–10.4)
PMV BLD: 11.4 FL — HIGH (ref 7.4–10.4)
POTASSIUM SERPL-MCNC: 3.5 MMOL/L — SIGNIFICANT CHANGE UP (ref 3.5–5)
POTASSIUM SERPL-MCNC: 3.6 MMOL/L — SIGNIFICANT CHANGE UP (ref 3.5–5)
POTASSIUM SERPL-SCNC: 3.5 MMOL/L — SIGNIFICANT CHANGE UP (ref 3.5–5)
POTASSIUM SERPL-SCNC: 3.6 MMOL/L — SIGNIFICANT CHANGE UP (ref 3.5–5)
PROT SERPL-MCNC: 6.7 G/DL — SIGNIFICANT CHANGE UP (ref 6–8)
PROTHROM AB SERPL-ACNC: 17.9 SEC — HIGH (ref 9.95–12.87)
RBC # BLD: 3.96 M/UL — LOW (ref 4.2–5.4)
RBC # BLD: 4.3 M/UL — SIGNIFICANT CHANGE UP (ref 4.2–5.4)
RBC # FLD: 14.5 % — SIGNIFICANT CHANGE UP (ref 11.5–14.5)
RBC # FLD: 14.6 % — HIGH (ref 11.5–14.5)
SODIUM SERPL-SCNC: 136 MMOL/L — SIGNIFICANT CHANGE UP (ref 135–146)
SODIUM SERPL-SCNC: 140 MMOL/L — SIGNIFICANT CHANGE UP (ref 135–146)
T4 AB SER-ACNC: 7.8 UG/DL — SIGNIFICANT CHANGE UP (ref 4.6–12)
TRIGL SERPL-MCNC: 77 MG/DL — SIGNIFICANT CHANGE UP
TROPONIN T, HIGH SENSITIVITY RESULT: 18 NG/L — HIGH (ref 6–13)
TROPONIN T, HIGH SENSITIVITY RESULT: 19 NG/L — HIGH (ref 6–13)
TSH SERPL-MCNC: 1.7 UIU/ML — SIGNIFICANT CHANGE UP (ref 0.27–4.2)
WBC # BLD: 7.67 K/UL — SIGNIFICANT CHANGE UP (ref 4.8–10.8)
WBC # BLD: 8.96 K/UL — SIGNIFICANT CHANGE UP (ref 4.8–10.8)
WBC # FLD AUTO: 7.67 K/UL — SIGNIFICANT CHANGE UP (ref 4.8–10.8)
WBC # FLD AUTO: 8.96 K/UL — SIGNIFICANT CHANGE UP (ref 4.8–10.8)

## 2024-09-10 PROCEDURE — 99233 SBSQ HOSP IP/OBS HIGH 50: CPT

## 2024-09-10 PROCEDURE — 93306 TTE W/DOPPLER COMPLETE: CPT | Mod: 26

## 2024-09-10 PROCEDURE — 99223 1ST HOSP IP/OBS HIGH 75: CPT

## 2024-09-10 PROCEDURE — 93010 ELECTROCARDIOGRAM REPORT: CPT

## 2024-09-10 RX ORDER — ENOXAPARIN SODIUM 100 MG/ML
85 INJECTION SUBCUTANEOUS EVERY 12 HOURS
Refills: 0 | Status: DISCONTINUED | OUTPATIENT
Start: 2024-09-10 | End: 2024-09-12

## 2024-09-10 RX ORDER — ENOXAPARIN SODIUM 100 MG/ML
85 INJECTION SUBCUTANEOUS EVERY 12 HOURS
Refills: 0 | Status: DISCONTINUED | OUTPATIENT
Start: 2024-09-10 | End: 2024-09-10

## 2024-09-10 RX ORDER — METOPROLOL TARTRATE 100 MG/1
25 TABLET ORAL ONCE
Refills: 0 | Status: COMPLETED | OUTPATIENT
Start: 2024-09-10 | End: 2024-09-10

## 2024-09-10 RX ORDER — POLYETHYLENE GLYCOL 3350 17 G/17G
17 POWDER, FOR SOLUTION ORAL DAILY
Refills: 0 | Status: DISCONTINUED | OUTPATIENT
Start: 2024-09-10 | End: 2024-09-12

## 2024-09-10 RX ORDER — ENOXAPARIN SODIUM 100 MG/ML
80 INJECTION SUBCUTANEOUS EVERY 12 HOURS
Refills: 0 | Status: DISCONTINUED | OUTPATIENT
Start: 2024-09-10 | End: 2024-09-10

## 2024-09-10 RX ORDER — FUROSEMIDE 40 MG
20 TABLET ORAL ONCE
Refills: 0 | Status: COMPLETED | OUTPATIENT
Start: 2024-09-10 | End: 2024-09-10

## 2024-09-10 RX ORDER — SENNA 187 MG
2 TABLET ORAL AT BEDTIME
Refills: 0 | Status: DISCONTINUED | OUTPATIENT
Start: 2024-09-10 | End: 2024-09-12

## 2024-09-10 RX ADMIN — METOPROLOL TARTRATE 12.5 MILLIGRAM(S): 100 TABLET ORAL at 18:00

## 2024-09-10 RX ADMIN — ROSUVASTATIN CALCIUM 5 MILLIGRAM(S): 10 TABLET ORAL at 21:46

## 2024-09-10 RX ADMIN — METOPROLOL TARTRATE 25 MILLIGRAM(S): 100 TABLET ORAL at 04:11

## 2024-09-10 RX ADMIN — LOSARTAN POTASSIUM 50 MILLIGRAM(S): 50 TABLET ORAL at 06:14

## 2024-09-10 RX ADMIN — Medication 20 MILLIGRAM(S): at 04:11

## 2024-09-10 RX ADMIN — APIXABAN 5 MILLIGRAM(S): 5 TABLET, FILM COATED ORAL at 06:14

## 2024-09-10 RX ADMIN — Medication 50 MICROGRAM(S): at 06:14

## 2024-09-10 RX ADMIN — METOPROLOL TARTRATE 12.5 MILLIGRAM(S): 100 TABLET ORAL at 06:15

## 2024-09-10 RX ADMIN — LETROZOLE 2.5 MILLIGRAM(S): 2.5 TABLET, FILM COATED ORAL at 11:31

## 2024-09-10 RX ADMIN — ENOXAPARIN SODIUM 85 MILLIGRAM(S): 100 INJECTION SUBCUTANEOUS at 21:47

## 2024-09-10 RX ADMIN — Medication 1 MILLIGRAM(S): at 04:34

## 2024-09-10 NOTE — CONSULT NOTE ADULT - SUBJECTIVE AND OBJECTIVE BOX
Patient is a 78y old  Female who presents with a chief complaint of Aflutter (10 Sep 2024 11:25)    HPI:  Ms. Ramos is 79 yo F with a PMH of HTN, HLD, Hypothyroidism, and Breast Cancer presenting after being seen in urgent care where she was found to be in Aflutter to a rate of 140. She had been experiencing a cough over the past 3 days which is what prompted her visit to urgent care, but otherwise denies other symptoms. She denies chest pain, shortness of breath, abdominal pain, nausea, vomiting, and diarrhea. She says that many years ago (>>10 years ago) she was told that she had a "faulty electrical system in my heart" but was never on any medication, blood thinners, or underwent an echocardiogram or other cardiac procedures. She is admitted to medicine for management of new Afib.    Vitals  Temp: 98  HR: 143 -> 96 -> 106  BP: 121/82  O2: 95% on RA -> 96% on RA  RR: 18 ->18    Labs  WBC: 7.64  HgB: 13.0  Lactate: 1.6    Imaging  CXR: Prominent bilateral pulmonary vessels may represent congestion.    EKG: Aflutter    In the ED  1L NS Bolus x1  Eliquis 5mg PO x1  Diltiazem 21mg IV x1  Diltiazem 60mg PO x1   (09 Sep 2024 21:23)      EP consulted for new onset aflutter with RVR. Pt presented with 3 days of cough, denies chest pain, palpitations, SOB. Sees cardiologist regularly in [place]. Endorses stress test that was many years ago.     REVIEW OF SYSTEMS    [x] A ten-point review of systems was otherwise negative except as noted.  [ ] Due to altered mental status/intubation, subjective information were not able to be obtained from the patient. History was obtained, to the extent possible, from review of the chart and collateral sources of information.      PAST MEDICAL & SURGICAL HISTORY:      Home Medications:  hydroCHLOROthiazide 12.5 mg oral tablet: 1 tab(s) orally once a day (09 Sep 2024 22:18)  letrozole 2.5 mg oral tablet: 1 tab(s) orally once a day (09 Sep 2024 22:19)  losartan 50 mg oral tablet: 1 tab(s) orally once a day (09 Sep 2024 22:19)  rosuvastatin 5 mg oral tablet: 1 tab(s) orally (09 Sep 2024 22:18)  Synthroid 50 mcg (0.05 mg) oral tablet: 1 tab(s) orally once a day (09 Sep 2024 22:18)      Allergies:  No Known Allergies      FAMILY HISTORY:      SOCIAL HISTORY:  CIGARETTES: denies   ALCOHOL: denies     MEDICATIONS  (STANDING):  enoxaparin Injectable 85 milliGRAM(s) SubCutaneous every 12 hours  hydrochlorothiazide 12.5 milliGRAM(s) Oral daily  letrozole 2.5 milliGRAM(s) Oral daily  levothyroxine 50 MICROGram(s) Oral daily  losartan 50 milliGRAM(s) Oral daily  metoprolol tartrate 12.5 milliGRAM(s) Oral every 12 hours  polyethylene glycol 3350 17 Gram(s) Oral daily  rosuvastatin 5 milliGRAM(s) Oral at bedtime  senna 2 Tablet(s) Oral at bedtime    MEDICATIONS  (PRN):      Vital Signs Last 24 Hrs  T(C): 36.7 (10 Sep 2024 13:09), Max: 36.9 (10 Sep 2024 10:53)  T(F): 98 (10 Sep 2024 13:09), Max: 98.4 (10 Sep 2024 10:53)  HR: 71 (10 Sep 2024 13:09) (71 - 140)  BP: 124/82 (10 Sep 2024 13:09) (113/79 - 156/65)  BP(mean): 112 (10 Sep 2024 00:35) (112 - 112)  RR: 18 (10 Sep 2024 13:09) (18 - 18)  SpO2: 97% (10 Sep 2024 10:53) (95% - 97%)    Parameters below as of 10 Sep 2024 10:53  Patient On (Oxygen Delivery Method): room air        PHYSICAL EXAM:    GENERAL: Obese female,  In no apparent distress, well nourished, and hydrated.  HEART: irregular rate and rhythm; No murmurs, rubs, or gallops.  PULMONARY: Clear to auscultation and perfusion.  No rales, wheezing, or rhonchi bilaterally.  ABDOMEN: Rounded, Soft, Nontender, Nondistended; Bowel sounds present  EXTREMITIES:  2+ Peripheral Pulses, No clubbing, cyanosis, or edema  NEUROLOGICAL: AO x4, BONILLA, speech clear    INTERPRETATION OF TELEMETRY: Aflitter variable block 80s    ECG:  < from: 12 Lead ECG (09.09.24 @ 14:57) >    Ventricular Rate 80 BPM    Atrial Rate 293 BPM    QRS Duration 124 ms    Q-T Interval 406 ms    QTC Calculation(Bazett) 468 ms    P Axis 266 degrees    R Axis 74 degrees    T Axis 153 degrees    Diagnosis Line Atrial flutter with variable A-V block  Anteroseptal infarct , age undetermined  ST & T wave abnormality, consider lateral ischemia  Abnormal ECG    < end of copied text >      I&O's Detail      LABS:                        12.4   7.67  )-----------( 162      ( 10 Sep 2024 11:17 )             37.1     09-10    140  |  105  |  14  ----------------------------<  135<H>  3.5   |  24  |  0.9    Ca    9.2      10 Sep 2024 11:17  Phos  2.9     09-10  Mg     1.9     09-10    TPro  6.7  /  Alb  4.5  /  TBili  0.8  /  DBili  x   /  AST  16  /  ALT  18  /  AlkPhos  54  09-10        PT/INR - ( 10 Sep 2024 11:17 )   PT: 17.90 sec;   INR: 1.56 ratio         PTT - ( 10 Sep 2024 11:17 )  PTT:35.4 sec  BNP      I&O's Detail      RADIOLOGY:  < from: TTE Echo Complete w/ Contrast w/o Doppler (09.10.24 @ 08:04) >  Summary:   1. Left ventricular ejection fraction, by visual estimation, is 35 to   40%. Abnormal septal motion consistent with conduction delay.   2. Normal left ventricular size. Mild concentric left ventricular   hypertrophy.   3. Normal right ventricular size. Borderline reduced right ventricular   systolic function.   4. Small, mobile mass attached to the atrial aspect of the posterior   mitralannulus (7.5 mm). Differential diagnosis includes degenerative   calcification vs. small thrombus. Transesophageal echocardiography may be   of further utility if clinically indicated.   5. Moderately enlarged left atrium.   6. Moderate mitral annular calcification.   7. Mild mitral valve regurgitation.   8. Mild-moderate tricuspid regurgitation.   9. Mild aortic regurgitation.  10. Sclerotic aortic valve with normal opening.    PHYSICIAN INTERPRETATION:  Left Ventricle: The left ventricular internal cavity size is normal.   There is mild concentric left ventricular hypertrophy. Left ventricular   ejection fraction, by visual estimation, is 35 to 40%. Abnormal   (paradoxical) septal motion, consistent with left bundle branch block.   SpectralDoppler shows restrictive pattern of left ventricular myocardial   filling (Grade III diastolic dysfunction).  Right Ventricle: The right ventricular size is normal. RV systolic   function is low normal.  Left Atrium: Moderately enlarged left atrium.Small, mobile mass attached   to the atrial aspect of the posterior mitral annulus (7.5 mm).   Differential diagnosis includes degenerative calcification vs. thrombus.   Transesophageal echocardiography may be of further utility if clinically   indicated.  Right Atrium: Normal right atrial size.  Pericardium: There is no evidence of pericardial effusion.  Mitral Valve: There is moderate mitral annular calcification. No evidence   of mitral valve stenosis. Mild mitral valve regurgitation is seen.  Tricuspid Valve: Mild-moderate tricuspid regurgitation is visualized.   Estimated pulmonary artery systolic pressure is 37.0 mmHg assuming a   right atrial pressure of 10 mmHg, which is consistent with borderline   pulmonary hypertension.  Aortic Valve: The aortic valve is trileaflet. Sclerotic aortic valve with   normal opening. Mild aortic valve regurgitation is seen.  Pulmonic Valve: Mild pulmonic valve regurgitation.  Aorta: The aortic root and ascending aorta are structurally normal, with   no evidence of dilitation.  Venous: The inferior vena cava was normal sized, with respiratory size   variation less than 50%.  In comparison to the previous echocardiogram(s): There are no prior   studies on this patient for comparison purposes.    < end of copied text >     Patient is a 78y old  Female who presents with a chief complaint of Aflutter (10 Sep 2024 11:25)    HPI:  Ms. Ramos is 77 yo F with a PMH of HTN, HLD, Hypothyroidism, and Breast Cancer presenting after being seen in urgent care where she was found to be in Aflutter to a rate of 140. She had been experiencing a cough over the past 3 days which is what prompted her visit to urgent care, but otherwise denies other symptoms. She denies chest pain, shortness of breath, abdominal pain, nausea, vomiting, and diarrhea. She says that many years ago (>>10 years ago) she was told that she had a "faulty electrical system in my heart" but was never on any medication, blood thinners, or underwent an echocardiogram or other cardiac procedures. She is admitted to medicine for management of new Afib.    Vitals  Temp: 98  HR: 143 -> 96 -> 106  BP: 121/82  O2: 95% on RA -> 96% on RA  RR: 18 ->18    Labs  WBC: 7.64  HgB: 13.0  Lactate: 1.6    Imaging  CXR: Prominent bilateral pulmonary vessels may represent congestion.    EKG: Aflutter    In the ED  1L NS Bolus x1  Eliquis 5mg PO x1  Diltiazem 21mg IV x1  Diltiazem 60mg PO x1   (09 Sep 2024 21:23)      EP consulted for new onset aflutter with RVR. Pt presented with 3 days of cough, denies chest pain, palpitations, SOB. Sees cardiologist regularly in [place]. Endorses stress test that was many years ago.     REVIEW OF SYSTEMS    [x] A ten-point review of systems was otherwise negative except as noted.  [ ] Due to altered mental status/intubation, subjective information were not able to be obtained from the patient. History was obtained, to the extent possible, from review of the chart and collateral sources of information.      PAST MEDICAL & SURGICAL HISTORY:      Home Medications:  hydroCHLOROthiazide 12.5 mg oral tablet: 1 tab(s) orally once a day (09 Sep 2024 22:18)  letrozole 2.5 mg oral tablet: 1 tab(s) orally once a day (09 Sep 2024 22:19)  losartan 50 mg oral tablet: 1 tab(s) orally once a day (09 Sep 2024 22:19)  rosuvastatin 5 mg oral tablet: 1 tab(s) orally (09 Sep 2024 22:18)  Synthroid 50 mcg (0.05 mg) oral tablet: 1 tab(s) orally once a day (09 Sep 2024 22:18)      Allergies:  No Known Allergies      FAMILY HISTORY:      SOCIAL HISTORY:  CIGARETTES: denies   ALCOHOL: denies     MEDICATIONS  (STANDING):  enoxaparin Injectable 85 milliGRAM(s) SubCutaneous every 12 hours  hydrochlorothiazide 12.5 milliGRAM(s) Oral daily  letrozole 2.5 milliGRAM(s) Oral daily  levothyroxine 50 MICROGram(s) Oral daily  losartan 50 milliGRAM(s) Oral daily  metoprolol tartrate 12.5 milliGRAM(s) Oral every 12 hours  polyethylene glycol 3350 17 Gram(s) Oral daily  rosuvastatin 5 milliGRAM(s) Oral at bedtime  senna 2 Tablet(s) Oral at bedtime    MEDICATIONS  (PRN):      Vital Signs Last 24 Hrs  T(C): 36.7 (10 Sep 2024 13:09), Max: 36.9 (10 Sep 2024 10:53)  T(F): 98 (10 Sep 2024 13:09), Max: 98.4 (10 Sep 2024 10:53)  HR: 71 (10 Sep 2024 13:09) (71 - 140)  BP: 124/82 (10 Sep 2024 13:09) (113/79 - 156/65)  BP(mean): 112 (10 Sep 2024 00:35) (112 - 112)  RR: 18 (10 Sep 2024 13:09) (18 - 18)  SpO2: 97% (10 Sep 2024 10:53) (95% - 97%)    Parameters below as of 10 Sep 2024 10:53  Patient On (Oxygen Delivery Method): room air        PHYSICAL EXAM:    GENERAL: Obese female,  In no apparent distress, well nourished, and hydrated.  HEART: irregular rate and rhythm; No murmurs, rubs, or gallops.  PULMONARY: Clear to auscultation and perfusion.  No rales, wheezing, or rhonchi bilaterally.  ABDOMEN: Rounded, Soft, Nontender, Nondistended; Bowel sounds present  EXTREMITIES:  2+ Peripheral Pulses, No clubbing, cyanosis, or edema  NEUROLOGICAL: AO x4, BONILLA, speech clear    INTERPRETATION OF TELEMETRY: Aflitter variable block 80s    ECG:  < from: 12 Lead ECG (09.09.24 @ 14:57) >    Ventricular Rate 80 BPM    Atrial Rate 293 BPM    QRS Duration 124 ms    Q-T Interval 406 ms    QTC Calculation(Bazett) 468 ms    P Axis 266 degrees    R Axis 74 degrees    T Axis 153 degrees    Diagnosis Line Atrial flutter with variable A-V block  Anteroseptal infarct , age undetermined  ST & T wave abnormality, consider lateral ischemia  Abnormal ECG    < end of copied text >      I&O's Detail      LABS:                        12.4   7.67  )-----------( 162      ( 10 Sep 2024 11:17 )             37.1     09-10    140  |  105  |  14  ----------------------------<  135<H>  3.5   |  24  |  0.9    Ca    9.2      10 Sep 2024 11:17  Phos  2.9     09-10  Mg     1.9     09-10    TPro  6.7  /  Alb  4.5  /  TBili  0.8  /  DBili  x   /  AST  16  /  ALT  18  /  AlkPhos  54  09-10        PT/INR - ( 10 Sep 2024 11:17 )   PT: 17.90 sec;   INR: 1.56 ratio         PTT - ( 10 Sep 2024 11:17 )  PTT:35.4 sec  BNP      I&O's Detail      RADIOLOGY:  < from: TTE Echo Complete w/ Contrast w/o Doppler (09.10.24 @ 08:04) >  Summary:   1. Left ventricular ejection fraction, by visual estimation, is 35 to   40%. Abnormal septal motion consistent with conduction delay.   2. Normal left ventricular size. Mild concentric left ventricular   hypertrophy.   3. Normal right ventricular size. Borderline reduced right ventricular   systolic function.   4. Small, mobile mass attached to the atrial aspect of the posterior   mitral annulus (7.5 mm). Differential diagnosis includes degenerative   calcification vs. small thrombus. Transesophageal echocardiography may be   of further utility if clinically indicated.   5. Moderately enlarged left atrium.   6. Moderate mitral annular calcification.   7. Mild mitral valve regurgitation.   8. Mild-moderate tricuspid regurgitation.   9. Mild aortic regurgitation.  10. Sclerotic aortic valve with normal opening.    PHYSICIAN INTERPRETATION:  Left Ventricle: The left ventricular internal cavity size is normal.   There is mild concentric left ventricular hypertrophy. Left ventricular   ejection fraction, by visual estimation, is 35 to 40%. Abnormal   (paradoxical) septal motion, consistent with left bundle branch block.   SpectralDoppler shows restrictive pattern of left ventricular myocardial   filling (Grade III diastolic dysfunction).  Right Ventricle: The right ventricular size is normal. RV systolic   function is low normal.  Left Atrium: Moderately enlarged left atrium.Small, mobile mass attached   to the atrial aspect of the posterior mitral annulus (7.5 mm).   Differential diagnosis includes degenerative calcification vs. thrombus.   Transesophageal echocardiography may be of further utility if clinically   indicated.  Right Atrium: Normal right atrial size.  Pericardium: There is no evidence of pericardial effusion.  Mitral Valve: There is moderate mitral annular calcification. No evidence   of mitral valve stenosis. Mild mitral valve regurgitation is seen.  Tricuspid Valve: Mild-moderate tricuspid regurgitation is visualized.   Estimated pulmonary artery systolic pressure is 37.0 mmHg assuming a   right atrial pressure of 10 mmHg, which is consistent with borderline   pulmonary hypertension.  Aortic Valve: The aortic valve is trileaflet. Sclerotic aortic valve with   normal opening. Mild aortic valve regurgitation is seen.  Pulmonic Valve: Mild pulmonic valve regurgitation.  Aorta: The aortic root and ascending aorta are structurally normal, with   no evidence of dilitation.  Venous: The inferior vena cava was normal sized, with respiratory size   variation less than 50%.  In comparison to the previous echocardiogram(s): There are no prior   studies on this patient for comparison purposes.    < end of copied text >

## 2024-09-10 NOTE — CHART NOTE - NSCHARTNOTEFT_GEN_A_CORE
Consulted for MARCELO for a.flutter with RVR Consulted for MARCELO/[facility] eval for a.adriannaer with RVR    - Patient is A0X3 and agreeable to procedure   - Patient swallows efficiently but have a removable denture.   - Keep NPO PMN for procedure on 9/11/2024

## 2024-09-10 NOTE — PROGRESS NOTE ADULT - ASSESSMENT
Ms. Ramos is 77 yo F with a PMH of HTN, HLD, Hypothyroidism, and Breast Cancer presenting for new onset Aflutter to rate of 140.    #New onset Aflutter  - Patient had cough x3d, took OTC cough medication does not remember name  - Went to urgent care, found to be in Aflutter to 140s, sent to ED  - Given Cardizem IV and PO in ED, rate controlled afterward  - Patient states she was told she had "faulty electrical system in my heart" >>10 years ago  - No ECHO, Anticoagulation, or other Cardiac workup she remembers  - Patient states she is not coughing, has no chest pain  - Given Eliquis 5mg in ED, continue BID  - Metoprolol Tartrate 12.5mg BID  - Dimer 270. Pt started on Levonox 80 BID  - TTE 9/10/24: Left ventricular ejection fraction, by visual estimation, is 35 to 40%. Abnormal septal motion consistent with conduction delay.  - F/u EP eval    #Hypothyroidism  - C/w Levothyroxine 50mcg QD  - TSH in AM    #HTN  - C/w Losartan 50mg QD, please confirm dose in AM    #HLD  - C/w Rosuvastatin 5mg QD    #Hx of Breast Cancer  - C/w Letrozole 2.5mg QD    #Misc  #Code Status: Full Code  #DVT ppx: Eliquis  #GI ppx: N/A  #Diet: DASH  #Activity: AAT  #Dispo: Telemetry    Pending(s):  Ms. Ramos is 79 yo F with a PMH of HTN, HLD, Hypothyroidism, and Breast Cancer presenting for new onset Aflutter to rate of 140.    #New onset Aflutter  #Acute HFrEF 2/2 Aflutter   #small mobile mass?  - Patient had cough x3d, took OTC cough medication does not remember name  - Went to urgent care, found to be in Aflutter to 140s, sent to ED  - Given Cardizem IV and PO in ED, rate controlled afterward  - Patient states she was told she had "faulty electrical system in my heart" >>10 years ago  - No ECHO, Anticoagulation, or other Cardiac workup she remembers  - Patient states she is not coughing, has no chest pain  - Given Eliquis 5mg in ED, continue BID  - Metoprolol Tartrate 12.5mg BID  - Dimer 270. Pt started on Levonox 85 BID  - TTE 9/10/24: Left ventricular ejection fraction, by visual estimation, is 35 to 40%. Abnormal septal motion consistent with conduction delay.  - F/u EP eval  - Follow up cardiology   - trend trop   - check blood culture     #Hypothyroidism  - C/w Levothyroxine 50mcg QD  - TSH in AM    #HTN  - C/w Losartan 50mg QD, please confirm dose in AM    #HLD  - C/w Rosuvastatin 5mg QD    #Hx of Breast Cancer  - C/w Letrozole 2.5mg QD    #Misc  #Code Status: Full Code  #DVT ppx: Eliquis  #GI ppx: N/A  #Diet: DASH  #Activity: AAT  #Dispo: Telemetry    Pending(s):

## 2024-09-10 NOTE — CONSULT NOTE ADULT - ASSESSMENT
Cardiologist: ([place]) Dr. Sabiha Vicente [phone]    Ms. Ramos is 77 yo F with a PMH of HTN, HLD, Hypothyroidism, and Breast Cancer with cough for 3 days who is presenting after being seen in urgent care where she was found to be in Aflutter to a rate of 140. Treated in Ed with Cardizem IV/PO.    TTE: 35-40%, small mobile mass      TSH:    Impression: Cardiologist: ([place]) Dr. Sabiha Vicente [phone]    Ms. Ramos is 79 yo F with a PMH of HTN, HLD, Hypothyroidism, and Lt Breast Cancer (about 30 years ago s/p RT/sx, now again dx in 2023 s/p sx on medical mgmt) with cough for 3 days who is presenting after being seen in urgent care where she was found to be in Aflutter to a rate of 140. Treated in Ed with Cardizem IV/PO.    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 Sabiha Vicente [phone]    Ms. Ramos is 77 yo F with a PMH of HTN, HLD, Hypothyroidism, and Lt Breast Cancer (about 30 years ago s/p RT/sx, now again dx in 2023 s/p sx on medical mgmt) with cough for 3 days who is presenting after being seen in urgent care where she was found to be in Aflutter to a rate of 140. Treated in Ed with Cardizem IV/PO.    TTE: 35-40%, small mobile mass, mod LA enlargement, mild-mod MR          Impression:  New onset Aflutter with  bpm  LBBB  New cardiomyopathy, 35-40%  Hx HTN, HLD  Hx hypothryoidism  Hx Breast ca - medical mgmt    Plan:  - Rate control with beta blockers, increase as tolerated  - Avoid CCB given new low EF  - Obtain records from outpatient cardiologist - recent notes, prior ischemic shaffer, EKGs, TTE  - Cont Lovenox for stroke prevention - will need Eliquis on discharge 5 mg PO Q 12  - Cardio c/s for further evaluation   - Cont tele while admitted  - Check TSH  - Please recall EPS once records obtained

## 2024-09-10 NOTE — PROGRESS NOTE ADULT - SUBJECTIVE AND OBJECTIVE BOX
PAYAL PORTILLO  78y  Female    Reason for Admission:   OVERNIGHT/INTERIM: Pt seen and examined at bedside during AM rounds. No acute or major events.       Vital Signs Last 24 Hrs  T(C): 36.9 (10 Sep 2024 10:53), Max: 36.9 (10 Sep 2024 10:53)  T(F): 98.4 (10 Sep 2024 10:53), Max: 98.4 (10 Sep 2024 10:53)  HR: 86 (10 Sep 2024 10:53) (86 - 143)  BP: 140/81 (10 Sep 2024 10:53) (113/79 - 156/65)  BP(mean): 112 (10 Sep 2024 00:35) (112 - 112)  RR: 18 (10 Sep 2024 10:53) (18 - 19)  SpO2: 97% (10 Sep 2024 10:53) (95% - 97%)    Parameters below as of 10 Sep 2024 10:53  Patient On (Oxygen Delivery Method): room air        PHYSICAL EXAM:  GENERAL: NAD  HEENT - NC/AT, pupils equal and reactive to light,   NECK: Supple  CHEST/LUNG: Clear to auscultation bilaterally; No rales, rhonchi, wheezing  HEART: Regular rate and rhythm; No murmurs, rubs, or gallops  ABDOMEN: Soft, Nontender, Nondistended; Bowel sounds present  EXTREMITIES:   No clubbing, cyanosis, or edema      LABS:        MedsMEDICATIONS  (STANDING):  enoxaparin Injectable 80 milliGRAM(s) SubCutaneous every 12 hours  hydrochlorothiazide 12.5 milliGRAM(s) Oral daily  letrozole 2.5 milliGRAM(s) Oral daily  levothyroxine 50 MICROGram(s) Oral daily  losartan 50 milliGRAM(s) Oral daily  metoprolol tartrate 12.5 milliGRAM(s) Oral every 12 hours  polyethylene glycol 3350 17 Gram(s) Oral daily  rosuvastatin 5 milliGRAM(s) Oral at bedtime  senna 2 Tablet(s) Oral at bedtime    MEDICATIONS  (PRN):             PAYAL PORTILLO  78y  Female    Reason for Admission:   OVERNIGHT/INTERIM: Pt seen and examined at bedside during AM rounds. No acute or major events.       Vital Signs Last 24 Hrs  T(C): 36.9 (10 Sep 2024 10:53), Max: 36.9 (10 Sep 2024 10:53)  T(F): 98.4 (10 Sep 2024 10:53), Max: 98.4 (10 Sep 2024 10:53)  HR: 86 (10 Sep 2024 10:53) (86 - 143)  BP: 140/81 (10 Sep 2024 10:53) (113/79 - 156/65)  BP(mean): 112 (10 Sep 2024 00:35) (112 - 112)  RR: 18 (10 Sep 2024 10:53) (18 - 19)  SpO2: 97% (10 Sep 2024 10:53) (95% - 97%)    Parameters below as of 10 Sep 2024 10:53  Patient On (Oxygen Delivery Method): room air        PHYSICAL EXAM:  GENERAL: NAD  HEENT - NC/AT, pupils equal and reactive to light,   NECK: Supple  CHEST/LUNG: Clear to auscultation bilaterally; No rales, rhonchi, wheezing  HEART: irRegular rate and rhythm; No murmurs, rubs, or gallops  ABDOMEN: Soft, Nontender, Nondistended; Bowel sounds present  EXTREMITIES:   No clubbing, cyanosis, or edema      LABS:    MedsMEDICATIONS  (STANDING):  enoxaparin Injectable 80 milliGRAM(s) SubCutaneous every 12 hours  hydrochlorothiazide 12.5 milliGRAM(s) Oral daily  letrozole 2.5 milliGRAM(s) Oral daily  levothyroxine 50 MICROGram(s) Oral daily  losartan 50 milliGRAM(s) Oral daily  metoprolol tartrate 12.5 milliGRAM(s) Oral every 12 hours  polyethylene glycol 3350 17 Gram(s) Oral daily  rosuvastatin 5 milliGRAM(s) Oral at bedtime  senna 2 Tablet(s) Oral at bedtime    MEDICATIONS  (PRN):    < from: TTE Echo Complete w/ Contrast w/o Doppler (09.10.24 @ 08:04) >    Summary:   1. Left ventricular ejection fraction, by visual estimation, is 35 to   40%. Abnormal septal motion consistent with conduction delay.   2. Normal left ventricular size. Mild concentric left ventricular   hypertrophy.   3. Normal right ventricular size. Borderline reduced right ventricular   systolic function.   4. Small, mobile mass attached to the atrial aspect of the posterior   mitralannulus (7.5 mm). Differential diagnosis includes degenerative   calcification vs. small thrombus. Transesophageal echocardiography may be   of further utility if clinically indicated.   5. Moderately enlarged left atrium.   6. Moderate mitral annular calcification.   7. Mild mitral valve regurgitation.   8. Mild-moderate tricuspid regurgitation.   9. Mild aortic regurgitation.  10. Sclerotic aortic valve with normal opening.    < end of copied text >

## 2024-09-10 NOTE — CONSULT NOTE ADULT - ASSESSMENT
Incomplete New-onset Aflutter  New LV dysfunction / Cardiomyopathy possibly secondary to arrhythmia      - MARCELO/DCCV tomorrow 9/11  - NPO after midnight tonight  - Change Lovenox to Eliquis on discharge  - Continue Metoprolol and Losartan  - Outpatient follow-up with primary cardiologist for ischemic workup

## 2024-09-10 NOTE — CONSULT NOTE ADULT - NS ATTEND AMEND GEN_ALL_CORE FT
Oriented - self; Oriented - place; Oriented - time I have evaluated the patient with the fellow/resident/ACP, Marlene, and I am providing specific recommendations regarding the patient's management. My discussion with the team members included review of the patient's history, physical examination, laboratory data and procedural studies. I agree with the documented findings and plan as detailed above, with the exceptions noted.     78 year old female with history of breast Ca s/p RTX and resection (x2), now on hormonal therapy, presenting with shortness of breath. She was found to have a new-onset typical atrial flutter, LBBB (unknown duration) and depressed EF. On TTE: small, mobile mass attached to the atrial aspect of the posterior mitral annulus. No prior records in [facility].    Recommendations:  Tele  Continue rate-control with beta-blockers  Continue AC  CCTA to evaluate LA mass and coronary arteries  Obtain records from cardiologist in [place]  Will follow     Angeles Gibson MD  Cardiac Electrophysiology I have evaluated the patient with the fellow/resident/ACP, Marlene, and I am providing specific recommendations regarding the patient's management. My discussion with the team members included review of the patient's history, physical examination, laboratory data and procedural studies. I agree with the documented findings and plan as detailed above, with the exceptions noted.     78 year old female with history of breast Ca s/p RTX and resection (x2), now on hormonal therapy, presenting with shortness of breath. She was found to have a new-onset typical atrial flutter, LBBB (unknown duration) and depressed EF. On TTE: small, mobile mass attached to the atrial aspect of the posterior mitral annulus. No prior records in [facility].    Recommendations:  Tele  Continue rate-control with beta-blockers  On schedule for MARCELO/DCCV tomorrow  Continue AC  Obtain records from cardiologist in [place]  Will follow    Angeles Gibson MD  Cardiac Electrophysiology

## 2024-09-10 NOTE — CONSULT NOTE ADULT - SUBJECTIVE AND OBJECTIVE BOX
Cardiologist:    HPI:  78 F with HTN, HLD presents with new-onset Aflutter.        PAST MEDICAL & SURGICAL HISTORY:      SOCIAL HISTORY: Denies EtOH, smoking or drug use    Home Medications:  hydroCHLOROthiazide 12.5 mg oral tablet: 1 tab(s) orally once a day (09 Sep 2024 22:18)  letrozole 2.5 mg oral tablet: 1 tab(s) orally once a day (09 Sep 2024 22:19)  losartan 50 mg oral tablet: 1 tab(s) orally once a day (09 Sep 2024 22:19)  rosuvastatin 5 mg oral tablet: 1 tab(s) orally (09 Sep 2024 22:18)  Synthroid 50 mcg (0.05 mg) oral tablet: 1 tab(s) orally once a day (09 Sep 2024 22:18)      MEDICATIONS  (STANDING):  enoxaparin Injectable 85 milliGRAM(s) SubCutaneous every 12 hours  hydrochlorothiazide 12.5 milliGRAM(s) Oral daily  letrozole 2.5 milliGRAM(s) Oral daily  levothyroxine 50 MICROGram(s) Oral daily  losartan 50 milliGRAM(s) Oral daily  metoprolol tartrate 12.5 milliGRAM(s) Oral every 12 hours  polyethylene glycol 3350 17 Gram(s) Oral daily  rosuvastatin 5 milliGRAM(s) Oral at bedtime  senna 2 Tablet(s) Oral at bedtime      REVIEW OF SYSTEMS:  CONSTITUTIONAL: No fever, weight loss, fatigue  NECK: No pain or stiffness  RESPIRATORY: See HPI  CARDIOVASCULAR: See HPI  GASTROINTESTINAL: No abdominal/epigastric pain, nausea, vomiting, hematemesis, diarrhea, constipation, melena or hematochezia  GENITOURINARY: No dysuria, frequency, hematuria, incontinence  NEUROLOGICAL: No headaches, memory loss, loss of strength, numbness, tremors  SKIN: No itching, burning, rashes, lesions   ENDOCRINE: No heat/cold intolerance or hair loss  MUSCULOSKELETAL: No joint pain or swelling  HEME/LYMPH: No easy bruising or bleeding gums    Vital Signs Last 24 Hrs  T(C): 36.7 (10 Sep 2024 13:09), Max: 36.9 (10 Sep 2024 10:53)  T(F): 98 (10 Sep 2024 13:09), Max: 98.4 (10 Sep 2024 10:53)  HR: 71 (10 Sep 2024 13:09) (71 - 140)  BP: 124/82 (10 Sep 2024 13:09) (113/79 - 156/65)  BP(mean): 112 (10 Sep 2024 00:35) (112 - 112)  RR: 18 (10 Sep 2024 13:09) (18 - 18)  SpO2: 97% (10 Sep 2024 10:53) (95% - 97%)    Parameters below as of 10 Sep 2024 10:53  Patient On (Oxygen Delivery Method): room air        PHYSICAL EXAM:  General: NAD, AAOx3  HEENT: NCAT, EOMI  Neck: supple, no JVD  CV:   Respiratory: CTA bilaterally, no wheeze, rales or rhonchi	  Abdomen: soft, NT/ND, +BS  Extremities: warm, ROM nl, no clubbing, cyanosis or edema  Neuro: Nonfocal                            12.4   7.67  )-----------( 162      ( 10 Sep 2024 11:17 )             37.1         09-10    140  |  105  |  14  ----------------------------<  135<H>  3.5   |  24  |  0.9    Ca    9.2      10 Sep 2024 11:17  Phos  2.9     09-10  Mg     1.9     09-10    TPro  6.7  /  Alb  4.5  /  TBili  0.8  /  DBili  x   /  AST  16  /  ALT  18  /  AlkPhos  54  09-10   Cardiologist:    HPI:  78 F with HTN, HLD with cough, congestion, orthopnea for 3 days.  No angina.  Found to have new-onset Aflutter.      SOCIAL HISTORY: Denies EtOH, smoking or drug use    Home Medications:  hydroCHLOROthiazide 12.5 mg oral tablet: 1 tab(s) orally once a day (09 Sep 2024 22:18)  letrozole 2.5 mg oral tablet: 1 tab(s) orally once a day (09 Sep 2024 22:19)  losartan 50 mg oral tablet: 1 tab(s) orally once a day (09 Sep 2024 22:19)  rosuvastatin 5 mg oral tablet: 1 tab(s) orally (09 Sep 2024 22:18)  Synthroid 50 mcg (0.05 mg) oral tablet: 1 tab(s) orally once a day (09 Sep 2024 22:18)      MEDICATIONS  (STANDING):  enoxaparin Injectable 85 milliGRAM(s) SubCutaneous every 12 hours  hydrochlorothiazide 12.5 milliGRAM(s) Oral daily  letrozole 2.5 milliGRAM(s) Oral daily  levothyroxine 50 MICROGram(s) Oral daily  losartan 50 milliGRAM(s) Oral daily  metoprolol tartrate 12.5 milliGRAM(s) Oral every 12 hours  polyethylene glycol 3350 17 Gram(s) Oral daily  rosuvastatin 5 milliGRAM(s) Oral at bedtime  senna 2 Tablet(s) Oral at bedtime      REVIEW OF SYSTEMS:  CONSTITUTIONAL: No fever, weight loss, fatigue  NECK: No pain or stiffness  RESPIRATORY: See HPI  CARDIOVASCULAR: See HPI  GASTROINTESTINAL: No abdominal/epigastric pain, nausea, vomiting, hematemesis, diarrhea, constipation, melena or hematochezia  GENITOURINARY: No dysuria, frequency, hematuria, incontinence  NEUROLOGICAL: No headaches, memory loss, loss of strength, numbness, tremors  SKIN: No itching, burning, rashes, lesions   ENDOCRINE: No heat/cold intolerance or hair loss  MUSCULOSKELETAL: No joint pain or swelling  HEME/LYMPH: No easy bruising or bleeding gums    Vital Signs Last 24 Hrs  T(C): 36.7 (10 Sep 2024 13:09), Max: 36.9 (10 Sep 2024 10:53)  T(F): 98 (10 Sep 2024 13:09), Max: 98.4 (10 Sep 2024 10:53)  HR: 71 (10 Sep 2024 13:09) (71 - 140)  BP: 124/82 (10 Sep 2024 13:09) (113/79 - 156/65)  BP(mean): 112 (10 Sep 2024 00:35) (112 - 112)  RR: 18 (10 Sep 2024 13:09) (18 - 18)  SpO2: 97% (10 Sep 2024 10:53) (95% - 97%)    Parameters below as of 10 Sep 2024 10:53  Patient On (Oxygen Delivery Method): room air        PHYSICAL EXAM:  General: NAD, AAOx3  HEENT: NCAT, EOMI  Neck: supple, no JVD  CV:   Respiratory: CTA bilaterally, no wheeze, rales or rhonchi	  Abdomen: soft, NT/ND, +BS  Extremities: warm, ROM nl, no clubbing, cyanosis or edema  Neuro: Nonfocal                            12.4   7.67  )-----------( 162      ( 10 Sep 2024 11:17 )             37.1         09-10    140  |  105  |  14  ----------------------------<  135<H>  3.5   |  24  |  0.9    Ca    9.2      10 Sep 2024 11:17  Phos  2.9     09-10  Mg     1.9     09-10    TPro  6.7  /  Alb  4.5  /  TBili  0.8  /  DBili  x   /  AST  16  /  ALT  18  /  AlkPhos  54  09-10   Cardiologist:  Dr. Sabiha Vicente in [phone]    HPI:  78 F with HTN, HLD with cough, congestion, orthopnea for 3 days.  No angina.  Found to have new-onset Aflutter.      SOCIAL HISTORY: Denies EtOH, smoking or drug use    Home Medications:  hydroCHLOROthiazide 12.5 mg oral tablet: 1 tab(s) orally once a day (09 Sep 2024 22:18)  letrozole 2.5 mg oral tablet: 1 tab(s) orally once a day (09 Sep 2024 22:19)  losartan 50 mg oral tablet: 1 tab(s) orally once a day (09 Sep 2024 22:19)  rosuvastatin 5 mg oral tablet: 1 tab(s) orally (09 Sep 2024 22:18)  Synthroid 50 mcg (0.05 mg) oral tablet: 1 tab(s) orally once a day (09 Sep 2024 22:18)      MEDICATIONS  (STANDING):  enoxaparin Injectable 85 milliGRAM(s) SubCutaneous every 12 hours  hydrochlorothiazide 12.5 milliGRAM(s) Oral daily  letrozole 2.5 milliGRAM(s) Oral daily  levothyroxine 50 MICROGram(s) Oral daily  losartan 50 milliGRAM(s) Oral daily  metoprolol tartrate 12.5 milliGRAM(s) Oral every 12 hours  polyethylene glycol 3350 17 Gram(s) Oral daily  rosuvastatin 5 milliGRAM(s) Oral at bedtime  senna 2 Tablet(s) Oral at bedtime      REVIEW OF SYSTEMS:  CONSTITUTIONAL: No fever, weight loss, fatigue  NECK: No pain or stiffness  RESPIRATORY: See HPI  CARDIOVASCULAR: See HPI  GASTROINTESTINAL: No abdominal/epigastric pain, nausea, vomiting, hematemesis, diarrhea, constipation, melena or hematochezia  GENITOURINARY: No dysuria, frequency, hematuria, incontinence  NEUROLOGICAL: No headaches, memory loss, loss of strength, numbness, tremors  SKIN: No itching, burning, rashes, lesions   ENDOCRINE: No heat/cold intolerance or hair loss  MUSCULOSKELETAL: No joint pain or swelling  HEME/LYMPH: No easy bruising or bleeding gums    Vital Signs Last 24 Hrs  T(C): 36.7 (10 Sep 2024 13:09), Max: 36.9 (10 Sep 2024 10:53)  T(F): 98 (10 Sep 2024 13:09), Max: 98.4 (10 Sep 2024 10:53)  HR: 71 (10 Sep 2024 13:09) (71 - 140)  BP: 124/82 (10 Sep 2024 13:09) (113/79 - 156/65)  BP(mean): 112 (10 Sep 2024 00:35) (112 - 112)  RR: 18 (10 Sep 2024 13:09) (18 - 18)  SpO2: 97% (10 Sep 2024 10:53) (95% - 97%)    Parameters below as of 10 Sep 2024 10:53  Patient On (Oxygen Delivery Method): room air        PHYSICAL EXAM:  General: NAD, AAOx3  HEENT: NCAT, EOMI  Neck: supple, no JVD  CV: irregular, no mumurs, no LE edema  Respiratory: CTA bilaterally, no wheeze, rales or rhonchi	  Abdomen: soft, NT/ND, +BS  Extremities: warm, ROM nl, no clubbing, cyanosis or edema  Neuro: Nonfocal                            12.4   7.67  )-----------( 162      ( 10 Sep 2024 11:17 )             37.1         09-10    140  |  105  |  14  ----------------------------<  135<H>  3.5   |  24  |  0.9    Ca    9.2      10 Sep 2024 11:17  Phos  2.9     09-10  Mg     1.9     09-10    TPro  6.7  /  Alb  4.5  /  TBili  0.8  /  DBili  x   /  AST  16  /  ALT  18  /  AlkPhos  54  09-10        < from: TTE Echo Complete w/ Contrast w/o Doppler (09.10.24 @ 08:04) >  Summary:   1. Left ventricular ejection fraction, by visual estimation, is 35 to   40%. Abnormal septal motion consistent with conduction delay.   2. Normal left ventricular size. Mild concentric left ventricular   hypertrophy.   3. Normal right ventricular size. Borderline reduced right ventricular   systolic function.   4. Small, mobile mass attached to the atrial aspect of the posterior   mitralannulus (7.5 mm). Differential diagnosis includes degenerative   calcification vs. small thrombus. Transesophageal echocardiography may be   of further utility if clinically indicated.   5. Moderately enlarged left atrium.   6. Moderate mitral annular calcification.   7. Mild mitral valve regurgitation.   8. Mild-moderate tricuspid regurgitation.   9. Mild aortic regurgitation.  10. Sclerotic aortic valve with normal opening.    < end of copied text >

## 2024-09-10 NOTE — PATIENT PROFILE ADULT - PUBLIC BENEFITS
Spoke to patient, orders given for fasting,provided central scheduling contact number and lab hours.  Patient verbalized understanding no

## 2024-09-10 NOTE — PATIENT PROFILE ADULT - FALL HARM RISK - HARM RISK INTERVENTIONS
Assistance with ambulation/Assistance OOB with selected safe patient handling equipment/Communicate Risk of Fall with Harm to all staff/Reinforce activity limits and safety measures with patient and family/Review medications for side effects contributing to fall risk/Sit up slowly, dangle for a short time, stand at bedside before walking/Tailored Fall Risk Interventions/Toileting schedule using arm’s reach rule for commode and bathroom/Visual Cue: Yellow wristband and red socks/Bed in lowest position, wheels locked, appropriate side rails in place/Call bell, personal items and telephone in reach/Instruct patient to call for assistance before getting out of bed or chair/Non-slip footwear when patient is out of bed/[place] to call system/Physically safe environment - no spills, clutter or unnecessary equipment/Purposeful Proactive Rounding/Room/bathroom lighting operational, light cord in reach

## 2024-09-11 ENCOUNTER — RESULT REVIEW (OUTPATIENT)
Age: 79
End: 2024-09-11

## 2024-09-11 ENCOUNTER — TRANSCRIPTION ENCOUNTER (OUTPATIENT)
Age: 79
End: 2024-09-11

## 2024-09-11 LAB
ALBUMIN SERPL ELPH-MCNC: 4.1 G/DL — SIGNIFICANT CHANGE UP (ref 3.5–5.2)
ALP SERPL-CCNC: 49 U/L — SIGNIFICANT CHANGE UP (ref 30–115)
ALT FLD-CCNC: 18 U/L — SIGNIFICANT CHANGE UP (ref 0–41)
ANION GAP SERPL CALC-SCNC: 11 MMOL/L — SIGNIFICANT CHANGE UP (ref 7–14)
AST SERPL-CCNC: 16 U/L — SIGNIFICANT CHANGE UP (ref 0–41)
BASOPHILS # BLD AUTO: 0.03 K/UL — SIGNIFICANT CHANGE UP (ref 0–0.2)
BASOPHILS NFR BLD AUTO: 0.4 % — SIGNIFICANT CHANGE UP (ref 0–1)
BILIRUB SERPL-MCNC: 0.6 MG/DL — SIGNIFICANT CHANGE UP (ref 0.2–1.2)
BUN SERPL-MCNC: 14 MG/DL — SIGNIFICANT CHANGE UP (ref 10–20)
CALCIUM SERPL-MCNC: 9.2 MG/DL — SIGNIFICANT CHANGE UP (ref 8.4–10.5)
CHLORIDE SERPL-SCNC: 106 MMOL/L — SIGNIFICANT CHANGE UP (ref 98–110)
CO2 SERPL-SCNC: 25 MMOL/L — SIGNIFICANT CHANGE UP (ref 17–32)
CREAT SERPL-MCNC: 0.9 MG/DL — SIGNIFICANT CHANGE UP (ref 0.7–1.5)
EGFR: 65 ML/MIN/1.73M2 — SIGNIFICANT CHANGE UP
EOSINOPHIL # BLD AUTO: 0.1 K/UL — SIGNIFICANT CHANGE UP (ref 0–0.7)
EOSINOPHIL NFR BLD AUTO: 1.4 % — SIGNIFICANT CHANGE UP (ref 0–8)
GLUCOSE SERPL-MCNC: 135 MG/DL — HIGH (ref 70–99)
HCT VFR BLD CALC: 36.5 % — LOW (ref 37–47)
HGB BLD-MCNC: 12.3 G/DL — SIGNIFICANT CHANGE UP (ref 12–16)
IMM GRANULOCYTES NFR BLD AUTO: 0.3 % — SIGNIFICANT CHANGE UP (ref 0.1–0.3)
LYMPHOCYTES # BLD AUTO: 2.12 K/UL — SIGNIFICANT CHANGE UP (ref 1.2–3.4)
LYMPHOCYTES # BLD AUTO: 30.5 % — SIGNIFICANT CHANGE UP (ref 20.5–51.1)
MAGNESIUM SERPL-MCNC: 2 MG/DL — SIGNIFICANT CHANGE UP (ref 1.8–2.4)
MCHC RBC-ENTMCNC: 30.8 PG — SIGNIFICANT CHANGE UP (ref 27–31)
MCHC RBC-ENTMCNC: 33.7 G/DL — SIGNIFICANT CHANGE UP (ref 32–37)
MCV RBC AUTO: 91.3 FL — SIGNIFICANT CHANGE UP (ref 81–99)
MONOCYTES # BLD AUTO: 0.75 K/UL — HIGH (ref 0.1–0.6)
MONOCYTES NFR BLD AUTO: 10.8 % — HIGH (ref 1.7–9.3)
NEUTROPHILS # BLD AUTO: 3.92 K/UL — SIGNIFICANT CHANGE UP (ref 1.4–6.5)
NEUTROPHILS NFR BLD AUTO: 56.6 % — SIGNIFICANT CHANGE UP (ref 42.2–75.2)
NRBC # BLD: 0 /100 WBCS — SIGNIFICANT CHANGE UP (ref 0–0)
PHOSPHATE SERPL-MCNC: 2.5 MG/DL — SIGNIFICANT CHANGE UP (ref 2.1–4.9)
PLATELET # BLD AUTO: 157 K/UL — SIGNIFICANT CHANGE UP (ref 130–400)
PMV BLD: 11.2 FL — HIGH (ref 7.4–10.4)
POTASSIUM SERPL-MCNC: 3.2 MMOL/L — LOW (ref 3.5–5)
POTASSIUM SERPL-SCNC: 3.2 MMOL/L — LOW (ref 3.5–5)
PROT SERPL-MCNC: 6 G/DL — SIGNIFICANT CHANGE UP (ref 6–8)
RBC # BLD: 4 M/UL — LOW (ref 4.2–5.4)
RBC # FLD: 14.5 % — SIGNIFICANT CHANGE UP (ref 11.5–14.5)
SODIUM SERPL-SCNC: 142 MMOL/L — SIGNIFICANT CHANGE UP (ref 135–146)
TROPONIN T, HIGH SENSITIVITY RESULT: 18 NG/L — HIGH (ref 6–13)
WBC # BLD: 6.94 K/UL — SIGNIFICANT CHANGE UP (ref 4.8–10.8)
WBC # FLD AUTO: 6.94 K/UL — SIGNIFICANT CHANGE UP (ref 4.8–10.8)

## 2024-09-11 PROCEDURE — 93312 ECHO TRANSESOPHAGEAL: CPT | Mod: 26,XU

## 2024-09-11 PROCEDURE — 92960 CARDIOVERSION ELECTRIC EXT: CPT

## 2024-09-11 PROCEDURE — 99232 SBSQ HOSP IP/OBS MODERATE 35: CPT

## 2024-09-11 PROCEDURE — 93320 DOPPLER ECHO COMPLETE: CPT | Mod: 26

## 2024-09-11 PROCEDURE — 93325 DOPPLER ECHO COLOR FLOW MAPG: CPT | Mod: 26

## 2024-09-11 PROCEDURE — 93308 TTE F-UP OR LMTD: CPT | Mod: 26

## 2024-09-11 RX ADMIN — METOPROLOL TARTRATE 12.5 MILLIGRAM(S): 100 TABLET ORAL at 06:06

## 2024-09-11 RX ADMIN — ROSUVASTATIN CALCIUM 5 MILLIGRAM(S): 10 TABLET ORAL at 21:34

## 2024-09-11 RX ADMIN — ENOXAPARIN SODIUM 85 MILLIGRAM(S): 100 INJECTION SUBCUTANEOUS at 21:35

## 2024-09-11 RX ADMIN — LOSARTAN POTASSIUM 50 MILLIGRAM(S): 50 TABLET ORAL at 06:07

## 2024-09-11 RX ADMIN — ENOXAPARIN SODIUM 85 MILLIGRAM(S): 100 INJECTION SUBCUTANEOUS at 08:45

## 2024-09-11 RX ADMIN — LETROZOLE 2.5 MILLIGRAM(S): 2.5 TABLET, FILM COATED ORAL at 12:49

## 2024-09-11 RX ADMIN — Medication 50 MICROGRAM(S): at 06:06

## 2024-09-11 RX ADMIN — METOPROLOL TARTRATE 12.5 MILLIGRAM(S): 100 TABLET ORAL at 17:32

## 2024-09-11 NOTE — PROGRESS NOTE ADULT - ASSESSMENT
Ms. Ramos is 79 yo F with a PMH of HTN, HLD, Hypothyroidism, and Breast Cancer presenting for new onset Aflutter to rate of 140.      Plan    #New onset Aflutter  - Patient had cough x3d, took OTC cough medication does not remember name  - Went to urgent care, found to be in Aflutter to 140s, sent to ED  - Given Cardizem IV and PO in ED, rate controlled afterward  - Patient states she was told she had "faulty electrical system in my heart" >>10 years ago  - No ECHO, Anticoagulation, or other Cardiac workup she remembers  - Patient states she is not coughing, has no chest pain  - c/w therapeutic lovenox. transition ot eliquis on dc  - Metoprolol Tartrate 12.5mg BID  - MARCELO/DCCV today 9/11    #Hypothyroidism  - C/w Levothyroxine 50mcg QD  - TSH 1.70    #HTN  - C/w Losartan 50mg QD    #HLD  - C/w Rosuvastatin 5mg QD    #Hx of Breast Cancer  - C/w Letrozole 2.5mg QD    #Misc  #Code Status: Full Code  #DVT ppx: Eliquis  #GI ppx: N/A  #Diet: DASH, NPO at Midnight  #Activity: AAT  #Dispo: Telemetry

## 2024-09-11 NOTE — DISCHARGE NOTE PROVIDER - NSDCCPCAREPLAN_GEN_ALL_CORE_FT
PRINCIPAL DISCHARGE DIAGNOSIS  Diagnosis: Atrial flutter  Assessment and Plan of Treatment: You were admitted because of afib with fast heart rate. You underwent cardioversion procedure and was converted to normal heart rhythm.   After discharge, please take medications as prescribed. Please follow up with primary care doctor and cardiologist.      SECONDARY DISCHARGE DIAGNOSES  Diagnosis: Dysrhythmia  Assessment and Plan of Treatment:

## 2024-09-11 NOTE — PROGRESS NOTE ADULT - ATTENDING COMMENTS
I saw and examined the patient after she came back from MARCELO/DCCV  She feels ok, no chest pain or palpitation    A/P ;  Paroxysmal Atrial Flutter with Rapid Ventricular Response:   new HFrEF: possibly tachycardia induced  She went for MARCELO/DCCV s/p cardioversion, back to sinus rhythm  Start on eliquis  Continue Metoprolol. Start on Entresto.   Possible discharge on 24 hrs
HPI as above.  Interval history: Pt seen and examined at bedside. No cp or sob.   Vital Signs (24 Hrs):  T(C): 36.9 (09-10-24 @ 10:53), Max: 36.9 (09-10-24 @ 10:53)  HR: 86 (09-10-24 @ 10:53) (86 - 143)  BP: 140/81 (09-10-24 @ 10:53) (113/79 - 156/65)  RR: 18 (09-10-24 @ 10:53) (18 - 19)  SpO2: 97% (09-10-24 @ 10:53) (95% - 97%)  Wt(kg): --  Daily Height in cm: 162.56 (09 Sep 2024 13:59)    Daily     I&O's Summary    PHYSICAL EXAM:  GENERAL: NAD, well-developed  HEAD:  Atraumatic, Normocephalic  EYES: EOMI, PERRLA, conjunctiva and sclera clear  NECK: Supple, No JVD  CHEST/LUNG: Clear to auscultation bilaterally; No wheeze  HEART: Regular rate and rhythm; No murmurs, rubs, or gallops  ABDOMEN: Soft, Nontender, Nondistended; Bowel sounds present  EXTREMITIES:  2+ Peripheral Pulses, No clubbing, cyanosis, or edema  PSYCH: AAOx3  NEUROLOGY: non-focal  SKIN: No rashes or lesions  Labs reviewed  Imaging reviewed independently and reviewed read  EKG reviewed independently and reviewed read    Plan as above. DW resident. Agreed to resident assessment and plan. Made edits.       #Progress Note Handoff  Pending (specify):  follow up cardiology   Family discussion: house staff updated pt family  Disposition: cardiac telemonitoring    Decision to admit the pt is based on acuity as above

## 2024-09-11 NOTE — DISCHARGE NOTE PROVIDER - HOSPITAL COURSE
Ms. Ramos is 77 yo F with a PMH of HTN, HLD, Hypothyroidism, and Breast Cancer presenting after being seen in urgent care where she was found to be in Aflutter to a rate of 140. She had been experiencing a cough over the past 3 days which is what prompted her visit to urgent care, but otherwise denies other symptoms. She denies chest pain, shortness of breath, abdominal pain, nausea, vomiting, and diarrhea. She says that many years ago (>>10 years ago) she was told that she had a "faulty electrical system in my heart" but was never on any medication, blood thinners, or underwent an echocardiogram or other cardiac procedures. She is admitted to medicine for management of new Afib.    In ED:  Vitals: HR: 143 -> 96 -> 106. BP: 121/82. O2: 96% on RA. RR: 18. EKG showed Aflutter with RVR. Got 1L NS Bolus x1. Eliquis 5mg PO x1. Diltiazem 21mg IV x1. Diltiazem 60mg PO x1    Hospital Course:  Pt was started on therapeutic lovenox. TTE showed EF of 35-40. Pt started on losartan 50mg and metop tartrate 12.5mg q12h. Successful MARCELO/DCCV on 9/11. On today's exam, pt has no acute complaints. Euvolemic. B/L clear lung sounds. Regular rhythm. Normal S1 S2. Pt is stable for dc.        #New onset Aflutter  - Patient had cough x3d, took OTC cough medication does not remember name  - Went to urgent care, found to be in Aflutter to 140s, sent to ED  - Given Cardizem IV and PO in ED, rate controlled afterward  - Patient states she was told she had "faulty electrical system in my heart" >>10 years ago  - Patient states she is not coughing, has no chest pain  - TTE : EF 35-40  - c/w therapeutic lovenox. transition to eliquis on dc  - c/w Metoprolol Tartrate 12.5mg BID  - s/p MARCELO/DCCV today 9/11    #Hypothyroidism  - C/w Levothyroxine 50mcg QD  - TSH 1.70    #HTN  - C/w Losartan 50mg QD    #HLD  - C/w Rosuvastatin 5mg QD    #Hx of Breast Cancer  - C/w Letrozole 2.5mg QD     Ms. Ramos is 79 yo F with a PMH of HTN, HLD, Hypothyroidism, and Breast Cancer presenting after being seen in urgent care where she was found to be in Aflutter to a rate of 140. She had been experiencing a cough over the past 3 days which is what prompted her visit to urgent care, but otherwise denies other symptoms. She denies chest pain, shortness of breath, abdominal pain, nausea, vomiting, and diarrhea. She says that many years ago (>>10 years ago) she was told that she had a "faulty electrical system in my heart" but was never on any medication, blood thinners, or underwent an echocardiogram or other cardiac procedures. She is admitted to medicine for management of new Afib.    In ED:  Vitals: HR: 143 -> 96 -> 106. BP: 121/82. O2: 96% on RA. RR: 18. EKG showed Aflutter with RVR. Got 1L NS Bolus x1. Eliquis 5mg PO x1. Diltiazem 21mg IV x1. Diltiazem 60mg PO x1    Hospital Course:  Pt was started on therapeutic lovenox. TTE showed EF of 35-40. Pt started on losartan 50mg and metop tartrate 12.5mg q12h. Successful MARCELO/DCCV on 9/11. On today's exam, pt has no acute complaints. Euvolemic. B/L clear lung sounds. Regular rhythm. Normal S1 S2. Pt is stable for dc.        #New onset Aflutter  - Patient had cough x3d, took OTC cough medication does not remember name  - Went to urgent care, found to be in Aflutter to 140s, sent to ED  - Given Cardizem IV and PO in ED, rate controlled afterward  - Patient states she was told she had "faulty electrical system in my heart" >>10 years ago  - Patient states she is not coughing, has no chest pain  - TTE : EF 35-40  - c/w therapeutic lovenox. transition to eliquis on dc  - c/w Metoprolol Tartrate 12.5mg BID  - s/p MARCELO/DCCV today 9/11  - F/U OP cardiologist for CCTA an ischemic work up    #Hypothyroidism  - C/w Levothyroxine 50mcg QD  - TSH 1.70    #HTN  - C/w Losartan 50mg QD    #HLD  - C/w Rosuvastatin 5mg QD    #Hx of Breast Cancer  - C/w Letrozole 2.5mg QD     Ms. Ramos is 79 yo F with a PMH of HTN, HLD, Hypothyroidism, and Breast Cancer presenting after being seen in urgent care where she was found to be in Aflutter to a rate of 140. She had been experiencing a cough over the past 3 days which is what prompted her visit to urgent care, but otherwise denies other symptoms. She denies chest pain, shortness of breath, abdominal pain, nausea, vomiting, and diarrhea. She says that many years ago (>>10 years ago) she was told that she had a "faulty electrical system in my heart" but was never on any medication, blood thinners, or underwent an echocardiogram or other cardiac procedures. She is admitted to medicine for management of new Afib.    In ED:  Vitals: HR: 143 -> 96 -> 106. BP: 121/82. O2: 96% on RA. RR: 18. EKG showed Aflutter with RVR. Got 1L NS Bolus x1. Eliquis 5mg PO x1. Diltiazem 21mg IV x1. Diltiazem 60mg PO x1    Hospital Course:  Pt was started on therapeutic lovenox. TTE showed EF of 35-40. Pt started on losartan 50mg and metop tartrate 12.5mg q12h. Successful MARCELO/DCCV on 9/11. On today's exam, pt has no acute complaints. Euvolemic. B/L clear lung sounds. Regular rhythm. Normal S1 S2. Pt is stable for dc.        Paroxysmal Atrial Flutter with Rapid Ventricular Response:   New HFrEF: possibly tachycardia induced  She went for MARCELO/DCCV s/p cardioversion, back to sinus rhythm  Start on eliquis 5mg bid.   Continue Metoprolol. Start on Losartan 50mg daily.   Follow outpatient with her cardiologist for CCTA an ischemic work up    #Hypothyroidism  - C/w Levothyroxine 50mcg QD  - TSH 1.70    #HTN  - C/w Losartan 50mg QD    #HLD  - C/w Rosuvastatin 5mg QD    #Hx of Breast Cancer  - C/w Letrozole 2.5mg QD

## 2024-09-11 NOTE — PROGRESS NOTE ADULT - SUBJECTIVE AND OBJECTIVE BOX
SUBJECTIVE/OVERNIGHT EVENTS      HOSPITAL COURSE    Ms. Ramos is 79 yo F with a PMH of HTN, HLD, Hypothyroidism, and Breast Cancer presenting after being seen in urgent care where she was found to be in Aflutter to a rate of 140. She had been experiencing a cough over the past 3 days which is what prompted her visit to urgent care, but otherwise denies other symptoms. She denies chest pain, shortness of breath, abdominal pain, nausea, vomiting, and diarrhea. She says that many years ago (>>10 years ago) she was told that she had a "faulty electrical system in my heart" but was never on any medication, blood thinners, or underwent an echocardiogram or other cardiac procedures. She is admitted to medicine for management of new Afib.    Today is hospital day 2d. This morning patient was seen and examined at bedside, resting comfortably in bed. No acute or major events overnight.    CODE STATUS:    FAMILY COMMUNICATION  Contact date:  Name of person contacted:  Relationship to patient:  Communication details:    MEDICATIONS  STANDING MEDICATIONS  enoxaparin Injectable 85 milliGRAM(s) SubCutaneous every 12 hours  hydrochlorothiazide 12.5 milliGRAM(s) Oral daily  letrozole 2.5 milliGRAM(s) Oral daily  levothyroxine 50 MICROGram(s) Oral daily  losartan 50 milliGRAM(s) Oral daily  metoprolol tartrate 12.5 milliGRAM(s) Oral every 12 hours  polyethylene glycol 3350 17 Gram(s) Oral daily  rosuvastatin 5 milliGRAM(s) Oral at bedtime  senna 2 Tablet(s) Oral at bedtime    PRN MEDICATIONS    VITALS  T(F): 97.6 (09-11-24 @ 04:18), Max: 98 (09-10-24 @ 13:09)  HR: 77 (09-11-24 @ 10:24) (71 - 77)  BP: 128/80 (09-11-24 @ 10:24) (124/82 - 130/82)  RR: 18 (09-11-24 @ 10:24) (18 - 18)  SpO2: 97% (09-11-24 @ 10:24) (97% - 97%)    PHYSICAL EXAM  GENERAL  (x  ) NAD, lying in bed comfortably     (  ) obtunded     (  ) lethargic     (  ) somnolent    HEAD  ( x ) Atraumatic     (  ) hematoma     (  ) laceration (specify location:       )     NECK  ( x ) Supple     (  ) neck stiffness     (  ) nuchal rigidity     (  )  no JVD     (  ) JVD present ( -- cm)    HEART  Rate -->  ( x ) normal rate    (  ) bradycardic    (  ) tachycardic  Rhythm -->  (  ) regular    (  ) regularly irregular    ( x ) irregularly irregular  Murmurs -->  (  ) normal s1/s2    (  ) systolic murmur    (  ) diastolic murmur    (  ) continuous murmur     (  ) S3 present    (  ) S4 present    LUNGS  (x  )Unlabored respirations     (  ) tachypnea  (  ) B/L air entry     (  ) decreased breath sounds in:  (location     )    (  ) no adventitious sound     (  ) crackles     (  ) wheezing      (  ) rhonchi      (specify location:       )  (  ) chest wall tenderness (specify location:       )    ABDOMEN  (  x) Soft     (  ) tense   |   (  ) nondistended     (  ) distended   |   (  ) +BS     (  ) hypoactive bowel sounds     (  ) hyperactive bowel sounds  (  ) nontender     (  ) RUQ tenderness     (  ) RLQ tenderness     (  ) LLQ tenderness     (  ) epigastric tenderness     (  ) diffuse tenderness  (  ) Splenomegaly      (  ) Hepatomegaly      (  ) Jaundice     (  ) ecchymosis     EXTREMITIES  (  x) Normal     (  ) Rash     (  ) ecchymosis     (  ) varicose veins      (  ) pitting edema     (  ) non-pitting edema   (  ) ulceration     (  ) gangrene:     (location:     )    NERVOUS SYSTEM  ( x ) A&Ox3     (  ) confused     (  ) lethargic  CN II-XII:     (  ) Intact     (  ) focal deficits  (Specify:     )   Upper extremities:     (  ) strength X/5     (  ) focal deficit (specify:    )  Lower extremities:     (  ) strength  X/5    (  ) focal deficit (specify:    )    SKIN  (x  ) No rashes or lesions     (  ) maculopapular rash     (  ) pustules     (  ) vesicles     (  ) ulcer     (  ) ecchymosis     (specify location:     )    (  ) Indwelling Salgado Catheter   Date insterted:    Reason (  ) Critical illness     (  ) urinary retention    (  ) Accurate Ins/Outs Monitoring     (  ) CMO patient    (  ) Central Line  Date inserted:  Location: (  ) Right IJ   (  ) Left IJ   (  ) Right Fem   (  ) Left Fem    (  ) SPC  (  ) pigtail  (  ) PEG tube  (  ) colostomy  (  ) jejunostomy  (  ) U-Dall    LABS             12.3   6.94  )-----------( 157      ( 09-11-24 @ 08:57 )             36.5     142  |  106  |  14  -------------------------<  135   09-11-24 @ 08:57  3.2  |  25  |  0.9    Ca      9.2     09-11-24 @ 08:57  Phos   2.5     09-11-24 @ 08:57  Mg     2.0     09-11-24 @ 08:57    TPro  6.0  /  Alb  4.1  /  TBili  0.6  /  DBili  x   /  AST  16  /  ALT  18  /  AlkPhos  49  /  GGT  x     09-11-24 @ 08:57    PT/INR - ( 09-10-24 @ 11:17 )   PT: 17.90 sec<H>;   INR: 1.56 ratio<H>  PTT - ( 09-10-24 @ 11:17 )  PTT:35.4 sec    Troponin T, High Sensitivity Result: 18 ng/L (09-11-24 @ 08:57)  Pro-Brain Natriuretic Peptide: 1580 pg/mL (09-10-24 @ 11:17)  Troponin T, High Sensitivity Result: 18 ng/L (09-10-24 @ 11:17)    Urinalysis Basic - ( 11 Sep 2024 08:57 )    Color: x / Appearance: x / SG: x / pH: x  Gluc: 135 mg/dL / Ketone: x  / Bili: x / Urobili: x   Blood: x / Protein: x / Nitrite: x   Leuk Esterase: x / RBC: x / WBC x   Sq Epi: x / Non Sq Epi: x / Bacteria: x          IMAGING

## 2024-09-11 NOTE — DISCHARGE NOTE PROVIDER - NSDCMRMEDTOKEN_GEN_ALL_CORE_FT
hydroCHLOROthiazide 12.5 mg oral tablet: 1 tab(s) orally once a day  letrozole 2.5 mg oral tablet: 1 tab(s) orally once a day  losartan 50 mg oral tablet: 1 tab(s) orally once a day  rosuvastatin 5 mg oral tablet: 1 tab(s) orally  Synthroid 50 mcg (0.05 mg) oral tablet: 1 tab(s) orally once a day   Eliquis 5 mg oral tablet: 1 tab(s) orally 2 times a day  hydroCHLOROthiazide 12.5 mg oral tablet: 1 tab(s) orally once a day  letrozole 2.5 mg oral tablet: 1 tab(s) orally once a day  losartan 50 mg oral tablet: 1 tab(s) orally once a day  Metoprolol Succinate ER 25 mg oral tablet, extended release: 1 tab(s) orally once a day  rosuvastatin 5 mg oral tablet: 1 tab(s) orally once a day (at bedtime)  Synthroid 50 mcg (0.05 mg) oral tablet: 1 tab(s) orally once a day

## 2024-09-11 NOTE — DISCHARGE NOTE PROVIDER - NSDCFUSCHEDAPPT_GEN_ALL_CORE_FT
[facility] Physician [facility]  [id number]  Scheduled Appointment: 09/27/2024

## 2024-09-11 NOTE — CHART NOTE - NSCHARTNOTEFT_GEN_A_CORE
INDICATION:  - New onset a fib     IMPRESSION:  - No EDMUNDO thrombus   - Moderately decreased LVEF   - MAC   - Sucessful cardioversion to NSR     PROCEDURE: Transesophageal echocardiogram    Primary Physician: Dr. Loomis   Assistant: Dr. Wright    ANESTHESIA TYPE:   - As documented by anesthesia     CONDITION:  [  ] Good    ESTIMATED BLOOD LOSS: None    COMPLICATIONS: None    FINDINGS:    After risks and benefits of procedures were explained, informed consent was obtained and placed in chart. Refer to Anesthesia note for sedation details.  The MARCELO probe was passed into the esophagus without difficulty.  Transesophageal images were obtained.  The MARCELO probe was removed without difficulty and examined.  There was no evidence for bleeding.  The patient tolerated the procedure well without any immediate MARCELO-related complications.      Preliminary Findings:  LA: Enlarged   EDMUNDO: Left atrial appendage was clear of clot and smoke.  LV: LVEF was estimated at 35%.   MV: Mild MR, no evidence of MS.   AV: Trace AI, normal opening.   RA: Enlarged  RV: Mildly decreased function  TV: Mild TR.   PV: Trace PI.   IAS: no PFO. No R-> L shunt by color flow doppler.   Pericardial Effusion: None  Aorta: There was mild simple atheroma seen in the thoracic aorta.     Patient successfully converted to sinus rhythm with synchronized  200J of direct current cardioversion. INDICATION:  - New onset a fib     IMPRESSION:  - No EDMUNDO thrombus   - Moderately decreased LVEF   - MAC   - Sucessful cardioversion to NSR     PROCEDURE: Transesophageal echocardiogram    Primary Physician: Dr. Loomis   Assistant: Dr. Wright    ANESTHESIA TYPE:   - As documented by anesthesia     CONDITION:  [  ] Good    ESTIMATED BLOOD LOSS: None    COMPLICATIONS: None    FINDINGS:    After risks and benefits of procedures were explained, informed consent was obtained and placed in chart. Refer to Anesthesia note for sedation details.  The MARCELO probe was passed into the esophagus without difficulty.  Transesophageal images were obtained.  The MARCELO probe was removed without difficulty and examined.  There was no evidence for bleeding.  The patient tolerated the procedure well without any immediate MARCELO-related complications.      Preliminary Findings:  LA: Enlarged   EDMUNDO: Left atrial appendage was clear of clot and smoke.  LV: LVEF was estimated at 35-40%.   MV: Mild MR, no evidence of MS.   AV: Trace AI, normal opening.   RA: Enlarged  RV: Mildly decreased function  TV: Mild TR.   PV: Trace PI.   IAS: no PFO. No R-> L shunt by color flow doppler.   Pericardial Effusion: None  Aorta: There was mild simple atheroma seen in the thoracic aorta.     Patient successfully converted to sinus rhythm with synchronized  200J of direct current cardioversion.

## 2024-09-12 ENCOUNTER — TRANSCRIPTION ENCOUNTER (OUTPATIENT)
Age: 79
End: 2024-09-12

## 2024-09-12 LAB
ALBUMIN SERPL ELPH-MCNC: 4 G/DL — SIGNIFICANT CHANGE UP (ref 3.5–5.2)
ALP SERPL-CCNC: 49 U/L — SIGNIFICANT CHANGE UP (ref 30–115)
ALT FLD-CCNC: 23 U/L — SIGNIFICANT CHANGE UP (ref 0–41)
ANION GAP SERPL CALC-SCNC: 11 MMOL/L — SIGNIFICANT CHANGE UP (ref 7–14)
AST SERPL-CCNC: 18 U/L — SIGNIFICANT CHANGE UP (ref 0–41)
BASOPHILS # BLD AUTO: 0.04 K/UL — SIGNIFICANT CHANGE UP (ref 0–0.2)
BASOPHILS NFR BLD AUTO: 0.6 % — SIGNIFICANT CHANGE UP (ref 0–1)
BILIRUB SERPL-MCNC: 0.5 MG/DL — SIGNIFICANT CHANGE UP (ref 0.2–1.2)
BUN SERPL-MCNC: 13 MG/DL — SIGNIFICANT CHANGE UP (ref 10–20)
CALCIUM SERPL-MCNC: 9.2 MG/DL — SIGNIFICANT CHANGE UP (ref 8.4–10.5)
CHLORIDE SERPL-SCNC: 104 MMOL/L — SIGNIFICANT CHANGE UP (ref 98–110)
CO2 SERPL-SCNC: 27 MMOL/L — SIGNIFICANT CHANGE UP (ref 17–32)
CREAT SERPL-MCNC: 0.9 MG/DL — SIGNIFICANT CHANGE UP (ref 0.7–1.5)
EGFR: 65 ML/MIN/1.73M2 — SIGNIFICANT CHANGE UP
EOSINOPHIL # BLD AUTO: 0.12 K/UL — SIGNIFICANT CHANGE UP (ref 0–0.7)
EOSINOPHIL NFR BLD AUTO: 1.7 % — SIGNIFICANT CHANGE UP (ref 0–8)
GLUCOSE SERPL-MCNC: 123 MG/DL — HIGH (ref 70–99)
HCT VFR BLD CALC: 35.2 % — LOW (ref 37–47)
HGB BLD-MCNC: 11.7 G/DL — LOW (ref 12–16)
IMM GRANULOCYTES NFR BLD AUTO: 0.4 % — HIGH (ref 0.1–0.3)
LYMPHOCYTES # BLD AUTO: 2.21 K/UL — SIGNIFICANT CHANGE UP (ref 1.2–3.4)
LYMPHOCYTES # BLD AUTO: 32.1 % — SIGNIFICANT CHANGE UP (ref 20.5–51.1)
MAGNESIUM SERPL-MCNC: 2 MG/DL — SIGNIFICANT CHANGE UP (ref 1.8–2.4)
MCHC RBC-ENTMCNC: 31.4 PG — HIGH (ref 27–31)
MCHC RBC-ENTMCNC: 33.2 G/DL — SIGNIFICANT CHANGE UP (ref 32–37)
MCV RBC AUTO: 94.4 FL — SIGNIFICANT CHANGE UP (ref 81–99)
MONOCYTES # BLD AUTO: 0.73 K/UL — HIGH (ref 0.1–0.6)
MONOCYTES NFR BLD AUTO: 10.6 % — HIGH (ref 1.7–9.3)
NEUTROPHILS # BLD AUTO: 3.75 K/UL — SIGNIFICANT CHANGE UP (ref 1.4–6.5)
NEUTROPHILS NFR BLD AUTO: 54.6 % — SIGNIFICANT CHANGE UP (ref 42.2–75.2)
NRBC # BLD: 0 /100 WBCS — SIGNIFICANT CHANGE UP (ref 0–0)
PLATELET # BLD AUTO: 140 K/UL — SIGNIFICANT CHANGE UP (ref 130–400)
PMV BLD: 10.8 FL — HIGH (ref 7.4–10.4)
POTASSIUM SERPL-MCNC: 3.3 MMOL/L — LOW (ref 3.5–5)
POTASSIUM SERPL-SCNC: 3.3 MMOL/L — LOW (ref 3.5–5)
PROT SERPL-MCNC: 6.1 G/DL — SIGNIFICANT CHANGE UP (ref 6–8)
RBC # BLD: 3.73 M/UL — LOW (ref 4.2–5.4)
RBC # FLD: 14.4 % — SIGNIFICANT CHANGE UP (ref 11.5–14.5)
SODIUM SERPL-SCNC: 142 MMOL/L — SIGNIFICANT CHANGE UP (ref 135–146)
WBC # BLD: 6.88 K/UL — SIGNIFICANT CHANGE UP (ref 4.8–10.8)
WBC # FLD AUTO: 6.88 K/UL — SIGNIFICANT CHANGE UP (ref 4.8–10.8)

## 2024-09-12 PROCEDURE — 99239 HOSP IP/OBS DSCHRG MGMT >30: CPT

## 2024-09-12 RX ORDER — POTASSIUM CHLORIDE 10 MEQ
40 TABLET, EXT RELEASE, PARTICLES/CRYSTALS ORAL ONCE
Refills: 0 | Status: COMPLETED | OUTPATIENT
Start: 2024-09-12 | End: 2024-09-12

## 2024-09-12 RX ORDER — APIXABAN 5 MG/1
1 TABLET, FILM COATED ORAL
Qty: 180 | Refills: 0
Start: 2024-09-12 | End: 2024-12-10

## 2024-09-12 RX ORDER — METOPROLOL TARTRATE 100 MG/1
1 TABLET ORAL
Qty: 90 | Refills: 5
Start: 2024-09-12 | End: 2026-03-05

## 2024-09-12 RX ORDER — APIXABAN 5 MG/1
1 TABLET, FILM COATED ORAL
Qty: 180 | Refills: 5
Start: 2024-09-12 | End: 2026-03-05

## 2024-09-12 RX ORDER — ROSUVASTATIN CALCIUM 10 MG/1
1 TABLET ORAL
Qty: 0 | Refills: 0 | DISCHARGE

## 2024-09-12 RX ADMIN — ENOXAPARIN SODIUM 85 MILLIGRAM(S): 100 INJECTION SUBCUTANEOUS at 08:19

## 2024-09-12 RX ADMIN — LOSARTAN POTASSIUM 50 MILLIGRAM(S): 50 TABLET ORAL at 05:38

## 2024-09-12 RX ADMIN — LETROZOLE 2.5 MILLIGRAM(S): 2.5 TABLET, FILM COATED ORAL at 11:43

## 2024-09-12 RX ADMIN — Medication 50 MICROGRAM(S): at 05:38

## 2024-09-12 RX ADMIN — METOPROLOL TARTRATE 12.5 MILLIGRAM(S): 100 TABLET ORAL at 05:38

## 2024-09-12 RX ADMIN — Medication 40 MILLIEQUIVALENT(S): at 11:42

## 2024-09-12 NOTE — DISCHARGE NOTE NURSING/CASE MANAGEMENT/SOCIAL WORK - PATIENT PORTAL LINK FT
You can access the FollowMyHealth Patient Portal offered by [facility] by registering at the following website: [email or web address]. By joining [employer]’s FollowMyHealth portal, you will also be able to view your health information using other applications (apps) compatible with our system.

## 2024-09-12 NOTE — DISCHARGE NOTE NURSING/CASE MANAGEMENT/SOCIAL WORK - NSDCPEELIQUISCOMP_GEN_ALL_CORE
Detail Level: Zone Apixaban/Eliquis is used to treat and prevent blood clots. If you are not able to swallow the tablets whole, they may be crushed and mixed in water, apple juice, or applesauce and promptly taken within four hours. Never skip a dose of Apixaban/Eliquis. If you forget to take your Apixaban/Eliquis, take a dose as soon as you remember. If it is almost time for your next Apixaban/Eliquis dose, wait until then and take a regular dose. DO NOT take an extra pill to ‘catch up’.  NEVER TAKE A DOUBLE DOSE. Notify your doctor that you missed a dose. Take Apixaban/Eliquis at the same time each morning and evening. Apixaban/Eliquis may be taken with other medication or food. Initiate Treatment: spironolactone 50 mg tablet: 1 tabs po QD\\nDuac 1.2 %(1 % base)-5 % topical gel,extended release: Apply thin film to the face qd (Replace tube every 3 months)

## 2024-09-13 VITALS
RESPIRATION RATE: 18 BRPM | SYSTOLIC BLOOD PRESSURE: 153 MMHG | TEMPERATURE: 99 F | DIASTOLIC BLOOD PRESSURE: 78 MMHG | HEART RATE: 85 BPM

## 2024-09-15 LAB
CULTURE RESULTS: SIGNIFICANT CHANGE UP
CULTURE RESULTS: SIGNIFICANT CHANGE UP
SPECIMEN SOURCE: SIGNIFICANT CHANGE UP
SPECIMEN SOURCE: SIGNIFICANT CHANGE UP

## 2024-09-18 DIAGNOSIS — Z92.3 PERSONAL HISTORY OF IRRADIATION: ICD-10-CM

## 2024-09-18 DIAGNOSIS — Z85.3 PERSONAL HISTORY OF MALIGNANT NEOPLASM OF BREAST: ICD-10-CM

## 2024-09-18 DIAGNOSIS — I44.7 LEFT BUNDLE-BRANCH BLOCK, UNSPECIFIED: ICD-10-CM

## 2024-09-18 DIAGNOSIS — Z79.811 LONG TERM (CURRENT) USE OF AROMATASE INHIBITORS: ICD-10-CM

## 2024-09-18 DIAGNOSIS — Z79.01 LONG TERM (CURRENT) USE OF ANTICOAGULANTS: ICD-10-CM

## 2024-09-18 DIAGNOSIS — E03.9 HYPOTHYROIDISM, UNSPECIFIED: ICD-10-CM

## 2024-09-18 DIAGNOSIS — I50.21 ACUTE SYSTOLIC (CONGESTIVE) HEART FAILURE: ICD-10-CM

## 2024-09-18 DIAGNOSIS — E78.5 HYPERLIPIDEMIA, UNSPECIFIED: ICD-10-CM

## 2024-09-18 DIAGNOSIS — Z79.890 HORMONE REPLACEMENT THERAPY: ICD-10-CM

## 2024-09-18 DIAGNOSIS — C50.919 MALIGNANT NEOPLASM OF UNSPECIFIED SITE OF UNSPECIFIED FEMALE BREAST: ICD-10-CM

## 2024-09-18 DIAGNOSIS — I48.92 UNSPECIFIED ATRIAL FLUTTER: ICD-10-CM

## 2024-09-18 DIAGNOSIS — Z87.891 PERSONAL HISTORY OF NICOTINE DEPENDENCE: ICD-10-CM

## 2024-09-18 DIAGNOSIS — I11.0 HYPERTENSIVE HEART DISEASE WITH HEART FAILURE: ICD-10-CM

## 2024-09-27 ENCOUNTER — APPOINTMENT (OUTPATIENT)
Dept: ORTHOPEDIC SURGERY | Facility: CLINIC | Age: 79
End: 2024-09-27

## 2024-11-15 NOTE — ED PROVIDER NOTE - NS ED MD DISPO ISOLATION TYPES
Diane and standing labs ordered per CPA with Dr. Ivey. Interval change due to change in symptoms/inflammation.    Last provider visit: 10/9/2024 with Damir Ryder PA-C  Next provider visit: 2025 with Damir Ryder PA-C  Last labs completed: 2024  Lab frequency: every 3 months              - standing labs available until 2024  Next labs due: 2024  Last TB screenin2023  PDC: 100% (Skyrizi)   None